# Patient Record
Sex: MALE | Race: WHITE | Employment: FULL TIME | URBAN - METROPOLITAN AREA
[De-identification: names, ages, dates, MRNs, and addresses within clinical notes are randomized per-mention and may not be internally consistent; named-entity substitution may affect disease eponyms.]

---

## 2017-06-16 ENCOUNTER — APPOINTMENT (OUTPATIENT)
Dept: GENERAL RADIOLOGY | Age: 37
End: 2017-06-16
Attending: INTERNAL MEDICINE
Payer: SUBSIDIZED

## 2017-06-16 ENCOUNTER — HOSPITAL ENCOUNTER (OUTPATIENT)
Age: 37
Setting detail: OBSERVATION
Discharge: HOME | End: 2017-06-18
Attending: EMERGENCY MEDICINE | Admitting: SURGERY
Payer: SUBSIDIZED

## 2017-06-16 ENCOUNTER — APPOINTMENT (OUTPATIENT)
Dept: ULTRASOUND IMAGING | Age: 37
End: 2017-06-16
Attending: EMERGENCY MEDICINE
Payer: SUBSIDIZED

## 2017-06-16 ENCOUNTER — ANESTHESIA (OUTPATIENT)
Dept: SURGERY | Age: 37
End: 2017-06-16
Payer: SUBSIDIZED

## 2017-06-16 ENCOUNTER — ANESTHESIA EVENT (OUTPATIENT)
Dept: SURGERY | Age: 37
End: 2017-06-16
Payer: SUBSIDIZED

## 2017-06-16 ENCOUNTER — APPOINTMENT (OUTPATIENT)
Dept: MRI IMAGING | Age: 37
End: 2017-06-16
Attending: EMERGENCY MEDICINE
Payer: SUBSIDIZED

## 2017-06-16 DIAGNOSIS — K81.0 ACUTE CHOLECYSTITIS: Primary | ICD-10-CM

## 2017-06-16 PROBLEM — K80.40 CHOLEDOCHOLITHIASIS WITH CHOLECYSTITIS: Status: ACTIVE | Noted: 2017-06-16

## 2017-06-16 LAB
ALBUMIN SERPL BCP-MCNC: 4.1 G/DL (ref 3.5–5)
ALBUMIN/GLOB SERPL: 1 {RATIO} (ref 1.1–2.2)
ALP SERPL-CCNC: 79 U/L (ref 45–117)
ALT SERPL-CCNC: 100 U/L (ref 12–78)
ANION GAP BLD CALC-SCNC: 8 MMOL/L (ref 5–15)
AST SERPL W P-5'-P-CCNC: 102 U/L (ref 15–37)
BASOPHILS # BLD AUTO: 0 K/UL (ref 0–0.1)
BASOPHILS # BLD: 0 % (ref 0–1)
BILIRUB SERPL-MCNC: 1 MG/DL (ref 0.2–1)
BUN SERPL-MCNC: 14 MG/DL (ref 6–20)
BUN/CREAT SERPL: 12 (ref 12–20)
CALCIUM SERPL-MCNC: 9.3 MG/DL (ref 8.5–10.1)
CHLORIDE SERPL-SCNC: 104 MMOL/L (ref 97–108)
CO2 SERPL-SCNC: 27 MMOL/L (ref 21–32)
CREAT SERPL-MCNC: 1.2 MG/DL (ref 0.7–1.3)
EOSINOPHIL # BLD: 0.1 K/UL (ref 0–0.4)
EOSINOPHIL NFR BLD: 0 % (ref 0–7)
ERYTHROCYTE [DISTWIDTH] IN BLOOD BY AUTOMATED COUNT: 13.4 % (ref 11.5–14.5)
GLOBULIN SER CALC-MCNC: 4 G/DL (ref 2–4)
GLUCOSE SERPL-MCNC: 233 MG/DL (ref 65–100)
HCT VFR BLD AUTO: 46.5 % (ref 36.6–50.3)
HGB BLD-MCNC: 15.9 G/DL (ref 12.1–17)
LIPASE SERPL-CCNC: 125 U/L (ref 73–393)
LYMPHOCYTES # BLD AUTO: 10 % (ref 12–49)
LYMPHOCYTES # BLD: 1.6 K/UL (ref 0.8–3.5)
MCH RBC QN AUTO: 28.1 PG (ref 26–34)
MCHC RBC AUTO-ENTMCNC: 34.2 G/DL (ref 30–36.5)
MCV RBC AUTO: 82.3 FL (ref 80–99)
MONOCYTES # BLD: 1.2 K/UL (ref 0–1)
MONOCYTES NFR BLD AUTO: 8 % (ref 5–13)
NEUTS SEG # BLD: 13 K/UL (ref 1.8–8)
NEUTS SEG NFR BLD AUTO: 82 % (ref 32–75)
PLATELET # BLD AUTO: 215 K/UL (ref 150–400)
POTASSIUM SERPL-SCNC: 3.3 MMOL/L (ref 3.5–5.1)
PROT SERPL-MCNC: 8.1 G/DL (ref 6.4–8.2)
RBC # BLD AUTO: 5.65 M/UL (ref 4.1–5.7)
SODIUM SERPL-SCNC: 139 MMOL/L (ref 136–145)
WBC # BLD AUTO: 15.9 K/UL (ref 4.1–11.1)

## 2017-06-16 PROCEDURE — 77030008684 HC TU ET CUF COVD -B: Performed by: ANESTHESIOLOGY

## 2017-06-16 PROCEDURE — 77030032490 HC SLV COMPR SCD KNE COVD -B: Performed by: SURGERY

## 2017-06-16 PROCEDURE — 77030018836 HC SOL IRR NACL ICUM -A: Performed by: SURGERY

## 2017-06-16 PROCEDURE — 99218 HC RM OBSERVATION: CPT

## 2017-06-16 PROCEDURE — 77030026438 HC STYL ET INTUB CARD -A: Performed by: ANESTHESIOLOGY

## 2017-06-16 PROCEDURE — 77030020061 HC IV BLD WRMR ADMIN SET 3M -B: Performed by: ANESTHESIOLOGY

## 2017-06-16 PROCEDURE — 74011000258 HC RX REV CODE- 258: Performed by: EMERGENCY MEDICINE

## 2017-06-16 PROCEDURE — 83690 ASSAY OF LIPASE: CPT | Performed by: EMERGENCY MEDICINE

## 2017-06-16 PROCEDURE — 74011250636 HC RX REV CODE- 250/636

## 2017-06-16 PROCEDURE — 77030020782 HC GWN BAIR PAWS FLX 3M -B

## 2017-06-16 PROCEDURE — 76060000038 HC ANESTHESIA 3.5 TO 4 HR: Performed by: SURGERY

## 2017-06-16 PROCEDURE — 77030011640 HC PAD GRND REM COVD -A: Performed by: SURGERY

## 2017-06-16 PROCEDURE — 74011250636 HC RX REV CODE- 250/636: Performed by: SURGERY

## 2017-06-16 PROCEDURE — 99284 EMERGENCY DEPT VISIT MOD MDM: CPT

## 2017-06-16 PROCEDURE — 85025 COMPLETE CBC W/AUTO DIFF WBC: CPT | Performed by: EMERGENCY MEDICINE

## 2017-06-16 PROCEDURE — 74011000250 HC RX REV CODE- 250

## 2017-06-16 PROCEDURE — 77030002933 HC SUT MCRYL J&J -A: Performed by: SURGERY

## 2017-06-16 PROCEDURE — 80053 COMPREHEN METABOLIC PANEL: CPT | Performed by: EMERGENCY MEDICINE

## 2017-06-16 PROCEDURE — 76010000134 HC OR TIME 3.5 TO 4 HR: Performed by: SURGERY

## 2017-06-16 PROCEDURE — 93005 ELECTROCARDIOGRAM TRACING: CPT

## 2017-06-16 PROCEDURE — 77030012022 HC APPL CLP ENDOSC COVD -C: Performed by: SURGERY

## 2017-06-16 PROCEDURE — 74330 X-RAY BILE/PANC ENDOSCOPY: CPT

## 2017-06-16 PROCEDURE — 77030035048 HC TRCR ENDOSC OPTCL COVD -B: Performed by: SURGERY

## 2017-06-16 PROCEDURE — 74011250636 HC RX REV CODE- 250/636: Performed by: EMERGENCY MEDICINE

## 2017-06-16 PROCEDURE — 77030035220 HC TRCR ENDOSC BLNTPRT ANCHR COVD -B: Performed by: SURGERY

## 2017-06-16 PROCEDURE — 88304 TISSUE EXAM BY PATHOLOGIST: CPT | Performed by: SURGERY

## 2017-06-16 PROCEDURE — 77030032435

## 2017-06-16 PROCEDURE — 74011000258 HC RX REV CODE- 258: Performed by: SURGERY

## 2017-06-16 PROCEDURE — 77030009852 HC PCH RTVR ENDOSC COVD -B: Performed by: SURGERY

## 2017-06-16 PROCEDURE — C1757 CATH, THROMBECTOMY/EMBOLECT: HCPCS | Performed by: SURGERY

## 2017-06-16 PROCEDURE — 96361 HYDRATE IV INFUSION ADD-ON: CPT

## 2017-06-16 PROCEDURE — 77030019908 HC STETH ESOPH SIMS -A: Performed by: ANESTHESIOLOGY

## 2017-06-16 PROCEDURE — 76705 ECHO EXAM OF ABDOMEN: CPT

## 2017-06-16 PROCEDURE — 77030020053 HC ELECTRD LAPSCP COVD -B: Performed by: SURGERY

## 2017-06-16 PROCEDURE — 77030008771 HC TU NG SALEM SUMP -A: Performed by: ANESTHESIOLOGY

## 2017-06-16 PROCEDURE — 77030031139 HC SUT VCRL2 J&J -A: Performed by: SURGERY

## 2017-06-16 PROCEDURE — 74011000250 HC RX REV CODE- 250: Performed by: SURGERY

## 2017-06-16 PROCEDURE — 96375 TX/PRO/DX INJ NEW DRUG ADDON: CPT

## 2017-06-16 PROCEDURE — 96365 THER/PROPH/DIAG IV INF INIT: CPT

## 2017-06-16 PROCEDURE — 77030035051: Performed by: SURGERY

## 2017-06-16 PROCEDURE — 76210000001 HC OR PH I REC 2.5 TO 3 HR: Performed by: SURGERY

## 2017-06-16 PROCEDURE — 74011250636 HC RX REV CODE- 250/636: Performed by: ANESTHESIOLOGY

## 2017-06-16 PROCEDURE — 36415 COLL VENOUS BLD VENIPUNCTURE: CPT | Performed by: EMERGENCY MEDICINE

## 2017-06-16 PROCEDURE — 74181 MRI ABDOMEN W/O CONTRAST: CPT

## 2017-06-16 PROCEDURE — 96376 TX/PRO/DX INJ SAME DRUG ADON: CPT

## 2017-06-16 RX ORDER — ONDANSETRON 2 MG/ML
INJECTION INTRAMUSCULAR; INTRAVENOUS AS NEEDED
Status: DISCONTINUED | OUTPATIENT
Start: 2017-06-16 | End: 2017-06-16 | Stop reason: HOSPADM

## 2017-06-16 RX ORDER — ONDANSETRON 2 MG/ML
4 INJECTION INTRAMUSCULAR; INTRAVENOUS
Status: COMPLETED | OUTPATIENT
Start: 2017-06-16 | End: 2017-06-16

## 2017-06-16 RX ORDER — SUCCINYLCHOLINE CHLORIDE 20 MG/ML
INJECTION INTRAMUSCULAR; INTRAVENOUS AS NEEDED
Status: DISCONTINUED | OUTPATIENT
Start: 2017-06-16 | End: 2017-06-16 | Stop reason: HOSPADM

## 2017-06-16 RX ORDER — NEOSTIGMINE METHYLSULFATE 1 MG/ML
INJECTION INTRAVENOUS AS NEEDED
Status: DISCONTINUED | OUTPATIENT
Start: 2017-06-16 | End: 2017-06-16 | Stop reason: HOSPADM

## 2017-06-16 RX ORDER — SODIUM CHLORIDE 9 MG/ML
150 INJECTION, SOLUTION INTRAVENOUS ONCE
Status: COMPLETED | OUTPATIENT
Start: 2017-06-16 | End: 2017-06-17

## 2017-06-16 RX ORDER — FLUMAZENIL 0.1 MG/ML
0.2 INJECTION INTRAVENOUS
Status: DISCONTINUED | OUTPATIENT
Start: 2017-06-16 | End: 2017-06-16

## 2017-06-16 RX ORDER — BUPIVACAINE HYDROCHLORIDE 5 MG/ML
INJECTION, SOLUTION EPIDURAL; INTRACAUDAL AS NEEDED
Status: DISCONTINUED | OUTPATIENT
Start: 2017-06-16 | End: 2017-06-16 | Stop reason: HOSPADM

## 2017-06-16 RX ORDER — ROCURONIUM BROMIDE 10 MG/ML
INJECTION, SOLUTION INTRAVENOUS AS NEEDED
Status: DISCONTINUED | OUTPATIENT
Start: 2017-06-16 | End: 2017-06-16 | Stop reason: HOSPADM

## 2017-06-16 RX ORDER — MIDAZOLAM HYDROCHLORIDE 1 MG/ML
0.5 INJECTION, SOLUTION INTRAMUSCULAR; INTRAVENOUS
Status: DISCONTINUED | OUTPATIENT
Start: 2017-06-16 | End: 2017-06-16 | Stop reason: HOSPADM

## 2017-06-16 RX ORDER — FENTANYL CITRATE 50 UG/ML
50 INJECTION, SOLUTION INTRAMUSCULAR; INTRAVENOUS AS NEEDED
Status: DISCONTINUED | OUTPATIENT
Start: 2017-06-16 | End: 2017-06-16 | Stop reason: HOSPADM

## 2017-06-16 RX ORDER — HYDROMORPHONE HYDROCHLORIDE 1 MG/ML
2 INJECTION, SOLUTION INTRAMUSCULAR; INTRAVENOUS; SUBCUTANEOUS
Status: COMPLETED | OUTPATIENT
Start: 2017-06-16 | End: 2017-06-16

## 2017-06-16 RX ORDER — SODIUM CHLORIDE, SODIUM LACTATE, POTASSIUM CHLORIDE, CALCIUM CHLORIDE 600; 310; 30; 20 MG/100ML; MG/100ML; MG/100ML; MG/100ML
100 INJECTION, SOLUTION INTRAVENOUS CONTINUOUS
Status: DISCONTINUED | OUTPATIENT
Start: 2017-06-16 | End: 2017-06-16 | Stop reason: HOSPADM

## 2017-06-16 RX ORDER — SODIUM CHLORIDE 0.9 % (FLUSH) 0.9 %
5-10 SYRINGE (ML) INJECTION EVERY 8 HOURS
Status: DISCONTINUED | OUTPATIENT
Start: 2017-06-16 | End: 2017-06-16

## 2017-06-16 RX ORDER — PROPOFOL 10 MG/ML
INJECTION, EMULSION INTRAVENOUS AS NEEDED
Status: DISCONTINUED | OUTPATIENT
Start: 2017-06-16 | End: 2017-06-16 | Stop reason: HOSPADM

## 2017-06-16 RX ORDER — HYDROMORPHONE HYDROCHLORIDE 1 MG/ML
1 INJECTION, SOLUTION INTRAMUSCULAR; INTRAVENOUS; SUBCUTANEOUS
Status: COMPLETED | OUTPATIENT
Start: 2017-06-16 | End: 2017-06-16

## 2017-06-16 RX ORDER — NALOXONE HYDROCHLORIDE 0.4 MG/ML
0.4 INJECTION, SOLUTION INTRAMUSCULAR; INTRAVENOUS; SUBCUTANEOUS
Status: DISCONTINUED | OUTPATIENT
Start: 2017-06-16 | End: 2017-06-16

## 2017-06-16 RX ORDER — GLYCOPYRROLATE 0.2 MG/ML
INJECTION INTRAMUSCULAR; INTRAVENOUS AS NEEDED
Status: DISCONTINUED | OUTPATIENT
Start: 2017-06-16 | End: 2017-06-16 | Stop reason: HOSPADM

## 2017-06-16 RX ORDER — LIDOCAINE HYDROCHLORIDE 20 MG/ML
INJECTION, SOLUTION EPIDURAL; INFILTRATION; INTRACAUDAL; PERINEURAL AS NEEDED
Status: DISCONTINUED | OUTPATIENT
Start: 2017-06-16 | End: 2017-06-16 | Stop reason: HOSPADM

## 2017-06-16 RX ORDER — SODIUM CHLORIDE 0.9 % (FLUSH) 0.9 %
5-10 SYRINGE (ML) INJECTION AS NEEDED
Status: DISCONTINUED | OUTPATIENT
Start: 2017-06-16 | End: 2017-06-16

## 2017-06-16 RX ORDER — HYDROMORPHONE HYDROCHLORIDE 1 MG/ML
0.5 INJECTION, SOLUTION INTRAMUSCULAR; INTRAVENOUS; SUBCUTANEOUS
Status: DISCONTINUED | OUTPATIENT
Start: 2017-06-16 | End: 2017-06-16 | Stop reason: HOSPADM

## 2017-06-16 RX ORDER — ONDANSETRON 2 MG/ML
4 INJECTION INTRAMUSCULAR; INTRAVENOUS AS NEEDED
Status: DISCONTINUED | OUTPATIENT
Start: 2017-06-16 | End: 2017-06-16 | Stop reason: HOSPADM

## 2017-06-16 RX ORDER — DEXAMETHASONE SODIUM PHOSPHATE 4 MG/ML
INJECTION, SOLUTION INTRA-ARTICULAR; INTRALESIONAL; INTRAMUSCULAR; INTRAVENOUS; SOFT TISSUE AS NEEDED
Status: DISCONTINUED | OUTPATIENT
Start: 2017-06-16 | End: 2017-06-16 | Stop reason: HOSPADM

## 2017-06-16 RX ORDER — DEXTROMETHORPHAN/PSEUDOEPHED 2.5-7.5/.8
1.2 DROPS ORAL
Status: DISCONTINUED | OUTPATIENT
Start: 2017-06-16 | End: 2017-06-16

## 2017-06-16 RX ORDER — FENTANYL CITRATE 50 UG/ML
25 INJECTION, SOLUTION INTRAMUSCULAR; INTRAVENOUS
Status: DISCONTINUED | OUTPATIENT
Start: 2017-06-16 | End: 2017-06-16 | Stop reason: HOSPADM

## 2017-06-16 RX ORDER — EPINEPHRINE 0.1 MG/ML
1 INJECTION INTRACARDIAC; INTRAVENOUS
Status: DISCONTINUED | OUTPATIENT
Start: 2017-06-16 | End: 2017-06-16

## 2017-06-16 RX ORDER — ONDANSETRON 2 MG/ML
4 INJECTION INTRAMUSCULAR; INTRAVENOUS
Status: DISCONTINUED | OUTPATIENT
Start: 2017-06-16 | End: 2017-06-18 | Stop reason: HOSPADM

## 2017-06-16 RX ORDER — SODIUM CHLORIDE 0.9 % (FLUSH) 0.9 %
5-10 SYRINGE (ML) INJECTION EVERY 8 HOURS
Status: DISCONTINUED | OUTPATIENT
Start: 2017-06-16 | End: 2017-06-18 | Stop reason: HOSPADM

## 2017-06-16 RX ORDER — HYDROCODONE BITARTRATE AND ACETAMINOPHEN 5; 325 MG/1; MG/1
1 TABLET ORAL AS NEEDED
Status: DISCONTINUED | OUTPATIENT
Start: 2017-06-16 | End: 2017-06-16 | Stop reason: HOSPADM

## 2017-06-16 RX ORDER — ATROPINE SULFATE 0.1 MG/ML
0.5 INJECTION INTRAVENOUS
Status: DISCONTINUED | OUTPATIENT
Start: 2017-06-16 | End: 2017-06-16

## 2017-06-16 RX ORDER — SODIUM CHLORIDE 0.9 % (FLUSH) 0.9 %
5-10 SYRINGE (ML) INJECTION AS NEEDED
Status: DISCONTINUED | OUTPATIENT
Start: 2017-06-16 | End: 2017-06-18 | Stop reason: HOSPADM

## 2017-06-16 RX ORDER — ACETAMINOPHEN 10 MG/ML
INJECTION, SOLUTION INTRAVENOUS AS NEEDED
Status: DISCONTINUED | OUTPATIENT
Start: 2017-06-16 | End: 2017-06-16 | Stop reason: HOSPADM

## 2017-06-16 RX ORDER — HYDROMORPHONE HYDROCHLORIDE 1 MG/ML
0.5 INJECTION, SOLUTION INTRAMUSCULAR; INTRAVENOUS; SUBCUTANEOUS ONCE
Status: COMPLETED | OUTPATIENT
Start: 2017-06-16 | End: 2017-06-16

## 2017-06-16 RX ORDER — HYDROMORPHONE HYDROCHLORIDE 2 MG/ML
INJECTION, SOLUTION INTRAMUSCULAR; INTRAVENOUS; SUBCUTANEOUS AS NEEDED
Status: DISCONTINUED | OUTPATIENT
Start: 2017-06-16 | End: 2017-06-16 | Stop reason: HOSPADM

## 2017-06-16 RX ORDER — FENTANYL CITRATE 50 UG/ML
25 INJECTION, SOLUTION INTRAMUSCULAR; INTRAVENOUS
Status: DISCONTINUED | OUTPATIENT
Start: 2017-06-16 | End: 2017-06-16

## 2017-06-16 RX ORDER — HYDROCODONE BITARTRATE AND ACETAMINOPHEN 5; 325 MG/1; MG/1
1-2 TABLET ORAL
Status: DISCONTINUED | OUTPATIENT
Start: 2017-06-16 | End: 2017-06-18 | Stop reason: HOSPADM

## 2017-06-16 RX ORDER — SODIUM CHLORIDE 9 MG/ML
150 INJECTION, SOLUTION INTRAVENOUS CONTINUOUS
Status: DISPENSED | OUTPATIENT
Start: 2017-06-16 | End: 2017-06-17

## 2017-06-16 RX ORDER — SODIUM CHLORIDE 9 MG/ML
75 INJECTION, SOLUTION INTRAVENOUS CONTINUOUS
Status: DISCONTINUED | OUTPATIENT
Start: 2017-06-16 | End: 2017-06-16

## 2017-06-16 RX ORDER — DIPHENHYDRAMINE HYDROCHLORIDE 50 MG/ML
12.5 INJECTION, SOLUTION INTRAMUSCULAR; INTRAVENOUS AS NEEDED
Status: DISCONTINUED | OUTPATIENT
Start: 2017-06-16 | End: 2017-06-16 | Stop reason: HOSPADM

## 2017-06-16 RX ORDER — MIDAZOLAM HYDROCHLORIDE 1 MG/ML
1 INJECTION, SOLUTION INTRAMUSCULAR; INTRAVENOUS AS NEEDED
Status: DISCONTINUED | OUTPATIENT
Start: 2017-06-16 | End: 2017-06-16 | Stop reason: HOSPADM

## 2017-06-16 RX ORDER — MIDAZOLAM HYDROCHLORIDE 1 MG/ML
.25-5 INJECTION, SOLUTION INTRAMUSCULAR; INTRAVENOUS
Status: DISCONTINUED | OUTPATIENT
Start: 2017-06-16 | End: 2017-06-16

## 2017-06-16 RX ORDER — HYDROMORPHONE HYDROCHLORIDE 1 MG/ML
0.5 INJECTION, SOLUTION INTRAMUSCULAR; INTRAVENOUS; SUBCUTANEOUS
Status: DISCONTINUED | OUTPATIENT
Start: 2017-06-16 | End: 2017-06-18 | Stop reason: HOSPADM

## 2017-06-16 RX ORDER — MIDAZOLAM HYDROCHLORIDE 1 MG/ML
INJECTION, SOLUTION INTRAMUSCULAR; INTRAVENOUS AS NEEDED
Status: DISCONTINUED | OUTPATIENT
Start: 2017-06-16 | End: 2017-06-16 | Stop reason: HOSPADM

## 2017-06-16 RX ORDER — SODIUM CHLORIDE, SODIUM LACTATE, POTASSIUM CHLORIDE, CALCIUM CHLORIDE 600; 310; 30; 20 MG/100ML; MG/100ML; MG/100ML; MG/100ML
25 INJECTION, SOLUTION INTRAVENOUS CONTINUOUS
Status: DISCONTINUED | OUTPATIENT
Start: 2017-06-16 | End: 2017-06-16 | Stop reason: HOSPADM

## 2017-06-16 RX ORDER — FENTANYL CITRATE 50 UG/ML
INJECTION, SOLUTION INTRAMUSCULAR; INTRAVENOUS AS NEEDED
Status: DISCONTINUED | OUTPATIENT
Start: 2017-06-16 | End: 2017-06-16 | Stop reason: HOSPADM

## 2017-06-16 RX ORDER — LIDOCAINE HYDROCHLORIDE 10 MG/ML
0.1 INJECTION, SOLUTION EPIDURAL; INFILTRATION; INTRACAUDAL; PERINEURAL AS NEEDED
Status: DISCONTINUED | OUTPATIENT
Start: 2017-06-16 | End: 2017-06-16 | Stop reason: HOSPADM

## 2017-06-16 RX ADMIN — PROPOFOL 50 MG: 10 INJECTION, EMULSION INTRAVENOUS at 14:37

## 2017-06-16 RX ADMIN — ONDANSETRON 4 MG: 2 INJECTION INTRAMUSCULAR; INTRAVENOUS at 16:45

## 2017-06-16 RX ADMIN — HYDROMORPHONE HYDROCHLORIDE 2 MG: 1 INJECTION, SOLUTION INTRAMUSCULAR; INTRAVENOUS; SUBCUTANEOUS at 05:26

## 2017-06-16 RX ADMIN — ACETAMINOPHEN 1000 MG: 10 INJECTION, SOLUTION INTRAVENOUS at 15:00

## 2017-06-16 RX ADMIN — PROPOFOL 50 MG: 10 INJECTION, EMULSION INTRAVENOUS at 14:32

## 2017-06-16 RX ADMIN — HYDROMORPHONE HYDROCHLORIDE 1 MG: 1 INJECTION, SOLUTION INTRAMUSCULAR; INTRAVENOUS; SUBCUTANEOUS at 03:56

## 2017-06-16 RX ADMIN — SODIUM CHLORIDE, SODIUM LACTATE, POTASSIUM CHLORIDE, AND CALCIUM CHLORIDE 25 ML/HR: 600; 310; 30; 20 INJECTION, SOLUTION INTRAVENOUS at 13:59

## 2017-06-16 RX ADMIN — SODIUM CHLORIDE 1000 ML: 900 INJECTION, SOLUTION INTRAVENOUS at 03:56

## 2017-06-16 RX ADMIN — PROPOFOL 200 MG: 10 INJECTION, EMULSION INTRAVENOUS at 14:25

## 2017-06-16 RX ADMIN — NEOSTIGMINE METHYLSULFATE 3 MG: 1 INJECTION INTRAVENOUS at 17:40

## 2017-06-16 RX ADMIN — PROPOFOL 50 MG: 10 INJECTION, EMULSION INTRAVENOUS at 14:29

## 2017-06-16 RX ADMIN — LIDOCAINE HYDROCHLORIDE 100 MG: 20 INJECTION, SOLUTION EPIDURAL; INFILTRATION; INTRACAUDAL; PERINEURAL at 14:25

## 2017-06-16 RX ADMIN — PIPERACILLIN SODIUM,TAZOBACTAM SODIUM 4.5 G: 4; .5 INJECTION, POWDER, FOR SOLUTION INTRAVENOUS at 16:35

## 2017-06-16 RX ADMIN — FENTANYL CITRATE 50 MCG: 50 INJECTION, SOLUTION INTRAMUSCULAR; INTRAVENOUS at 16:30

## 2017-06-16 RX ADMIN — SODIUM CHLORIDE 150 ML/HR: 900 INJECTION, SOLUTION INTRAVENOUS at 08:50

## 2017-06-16 RX ADMIN — SODIUM CHLORIDE, SODIUM LACTATE, POTASSIUM CHLORIDE, AND CALCIUM CHLORIDE: 600; 310; 30; 20 INJECTION, SOLUTION INTRAVENOUS at 17:45

## 2017-06-16 RX ADMIN — ROCURONIUM BROMIDE 10 MG: 10 INJECTION, SOLUTION INTRAVENOUS at 14:25

## 2017-06-16 RX ADMIN — HYDROMORPHONE HYDROCHLORIDE 1.2 MG: 2 INJECTION, SOLUTION INTRAMUSCULAR; INTRAVENOUS; SUBCUTANEOUS at 16:43

## 2017-06-16 RX ADMIN — GLYCOPYRROLATE 0.2 MG: 0.2 INJECTION INTRAMUSCULAR; INTRAVENOUS at 14:17

## 2017-06-16 RX ADMIN — GLYCOPYRROLATE 0.5 MG: 0.2 INJECTION INTRAMUSCULAR; INTRAVENOUS at 17:40

## 2017-06-16 RX ADMIN — MIDAZOLAM HYDROCHLORIDE 2 MG: 1 INJECTION, SOLUTION INTRAMUSCULAR; INTRAVENOUS at 14:17

## 2017-06-16 RX ADMIN — ROCURONIUM BROMIDE 10 MG: 10 INJECTION, SOLUTION INTRAVENOUS at 14:44

## 2017-06-16 RX ADMIN — DEXAMETHASONE SODIUM PHOSPHATE 8 MG: 4 INJECTION, SOLUTION INTRA-ARTICULAR; INTRALESIONAL; INTRAMUSCULAR; INTRAVENOUS; SOFT TISSUE at 14:50

## 2017-06-16 RX ADMIN — FENTANYL CITRATE 100 MCG: 50 INJECTION, SOLUTION INTRAMUSCULAR; INTRAVENOUS at 14:25

## 2017-06-16 RX ADMIN — SODIUM CHLORIDE 150 ML/HR: 900 INJECTION, SOLUTION INTRAVENOUS at 18:17

## 2017-06-16 RX ADMIN — SUCCINYLCHOLINE CHLORIDE 180 MG: 20 INJECTION INTRAMUSCULAR; INTRAVENOUS at 14:26

## 2017-06-16 RX ADMIN — ROCURONIUM BROMIDE 10 MG: 10 INJECTION, SOLUTION INTRAVENOUS at 15:50

## 2017-06-16 RX ADMIN — PIPERACILLIN SODIUM,TAZOBACTAM SODIUM 4.5 G: 4; .5 INJECTION, POWDER, FOR SOLUTION INTRAVENOUS at 08:50

## 2017-06-16 RX ADMIN — HYDROMORPHONE HYDROCHLORIDE 0.8 MG: 2 INJECTION, SOLUTION INTRAMUSCULAR; INTRAVENOUS; SUBCUTANEOUS at 17:56

## 2017-06-16 RX ADMIN — ONDANSETRON HYDROCHLORIDE 4 MG: 2 INJECTION, SOLUTION INTRAMUSCULAR; INTRAVENOUS at 03:56

## 2017-06-16 RX ADMIN — HYDROMORPHONE HYDROCHLORIDE 0.5 MG: 1 INJECTION, SOLUTION INTRAMUSCULAR; INTRAVENOUS; SUBCUTANEOUS at 11:48

## 2017-06-16 RX ADMIN — SODIUM CHLORIDE, SODIUM LACTATE, POTASSIUM CHLORIDE, AND CALCIUM CHLORIDE: 600; 310; 30; 20 INJECTION, SOLUTION INTRAVENOUS at 15:45

## 2017-06-16 RX ADMIN — ROCURONIUM BROMIDE 30 MG: 10 INJECTION, SOLUTION INTRAVENOUS at 16:06

## 2017-06-16 RX ADMIN — ROCURONIUM BROMIDE 30 MG: 10 INJECTION, SOLUTION INTRAVENOUS at 14:32

## 2017-06-16 NOTE — ROUTINE PROCESS
TRANSFER - IN REPORT:    Verbal report received from VITO castillo RN(name) on Zarina Estrada  being received from OR(unit) for routine post - op      Report consisted of patients Situation, Background, Assessment and   Recommendations(SBAR). Information from the following report(s) OR Summary was reviewed with the receiving nurse. Opportunity for questions and clarification was provided. Assessment completed upon patients arrival to unit and care assumed.

## 2017-06-16 NOTE — IP AVS SNAPSHOT
Summary of Care Report The Summary of Care report has been created to help improve care coordination. Users with access to [employer] or [address] (Web-based application) may access additional patient information including the Discharge Summary. If you are not currently a [address] user and need more information, please call the number listed below in the [place] [address] section and ask to be connected with Medical Records. Facility Information Name Address Phone [address] [address] [phone] Patient Information Patient Name Sex ABBEY Carnes ([id number]) Male 1980 Discharge Information Admitting Provider Service Area Unit Kayce Garcia MD / [phone] [facility] 2 General Surgery / [phone] Discharge Provider Discharge Date/Time Discharge Disposition Destination (none) 2017 Afternoon (Pending) AHR (none) Patient Language Language ENGLISH [13] Hospital Problems as of 2017  Reviewed: 2017 11:47 AM by Kayce Garcia MD  
  
  
  
 Class Noted - Resolved Last Modified POA Active Problems Choledocholithiasis with cholecystitis  2017 - Present 2017 by Kayce Garcia MD Unknown Entered by Kayce Garcia MD  
  
Non-Hospital Problems as of 2017  Reviewed: 2017 11:47 AM by Kayce Garcia MD  
 None You are allergic to the following No active allergies Current Discharge Medication List  
  
START taking these medications Dose & Instructions Dispensing Information Comments HYDROcodone-acetaminophen 5-325 mg per tablet Commonly known as:  Opal Perez Dose:  1-2 Tab Take 1-2 Tabs by mouth every four (4) hours as needed for Pain. Max Daily Amount: 12 Tabs. Quantity:  30 Tab Refills:  0 Surgery Information ID Date/Time Status Primary Surgeon All Procedures Location [id number] 6/16/2017 [address] MD LAPAROSCOPIC CHOLECYSTECTOMY  
ENDOSCOPIC RETROGRADE CHOLANGIOPANCREATOGRAPHY [facility] MAIN OR Follow-up Information Follow up With Details Comments Contact Info None   None (395) Patient stated that they have no PCP Discharge Instructions Cholecystectomy: What to Expect at [facility] Your Recovery After your surgery, it is normal to feel weak and tired for several days after you return home. Your belly may be swollen. If you had laparoscopic surgery, you may also have pain in your shoulder for about 24 hours. You may have gas or need to burp a lot at first, and a few people get diarrhea. The diarrhea usually goes away in 2 to 4 weeks, but it may last longer. How quickly you recover depends on whether you had a laparoscopic or open surgery. · For a laparoscopic surgery, most people can go back to work or their normal routine in 1 to 2 weeks, but it may take longer, depending on the type of work you do. · For an open surgery, it will probably take 4 to 6 weeks before you get back to your normal routine. This care sheet gives you a general idea about how long it will take for you to recover. However, each person recovers at a different pace. Follow the steps below to get better as quickly as possible. How can you care for yourself at home? Activity · Rest when you feel tired. Getting enough sleep will help you recover. · Try to walk each day. Start out by walking a little more than you did the day before. Gradually increase the amount you walk. Walking boosts blood flow and helps prevent pneumonia and constipation. · For about 2 to 4 weeks, avoid lifting anything that would make you strain. This may include a child, heavy grocery bags and milk containers, a heavy briefcase or backpack, cat litter or dog food bags, or a vacuum . · Avoid strenuous activities, such as biking, jogging, weightlifting, and aerobic exercise, until your doctor says it is okay. · You may shower 24 to 48 hours after surgery, if your doctor okays it. Pat the cut (incision) dry. Do not take a bath for the first 2 weeks, or until your doctor tells you it is okay. · You may drive when you are no longer taking pain medicine and can quickly move your foot from the gas pedal to the brake. You must also be able to sit comfortably for a long period of time, even if you do not plan to go far. You might get caught in traffic. · For a laparoscopic surgery, most people can go back to work or their normal routine in 1 to 2 weeks, but it may take longer. For an open surgery, it will probably take 4 to 6 weeks before you get back to your normal routine. · Your doctor will tell you when you can have sex again. Diet · Eat smaller meals more often instead of fewer larger meals. You can eat a normal diet, but avoid eating fatty foods for about 1 month. Fatty foods include hamburger, whole milk, cheese, and many snack foods. If your stomach is upset, try bland, low-fat foods like plain rice, broiled chicken, toast, and yogurt. · Drink plenty of fluids (unless your doctor tells you not to). · If you have diarrhea, try avoiding spicy foods, dairy products, fatty foods, and alcohol. You can also watch to see if specific foods cause it, and stop eating them. If the diarrhea continues for more than 2 weeks, talk to your doctor. · You may notice that your bowel movements are not regular right after your surgery. This is common. Try to avoid constipation and straining with bowel movements. You may want to take a fiber supplement every day. If you have not had a bowel movement after a couple of days, ask your doctor about taking a mild laxative. Medicines · Take pain medicines exactly as directed. ¨ If the doctor gave you a prescription medicine for pain, take it as prescribed. ¨ If you are not taking a prescription pain medicine, take an over-the-counter medicine such as acetaminophen (Tylenol), ibuprofen (Advil, Motrin), or naproxen (Aleve). Read and follow all instructions on the label. ¨ Do not take two or more pain medicines at the same time unless the doctor told you to. Many pain medicines contain acetaminophen, which is Tylenol. Too much Tylenol can be harmful. · If you think your pain medicine is making you sick to your stomach: 
¨ Take your medicine after meals (unless your doctor tells you not to). ¨ Ask your doctor for a different pain medicine. · If your doctor prescribed antibiotics, take them as directed. Do not stop taking them just because you feel better. You need to take the full course of antibiotics. Incision care · If you have strips of tape on the incision, or cut, leave the tape on for a week or until it falls off. · After 24 to 48 hours, wash the area daily with warm, soapy water, and pat it dry. · You may have staples to hold the cut together. Keep them dry until your doctor takes them out. This is usually in 7 to 10 days. · Keep the area clean and dry. You may cover it with a gauze bandage if it weeps or rubs against clothing. Change the bandage every day. Ice · To reduce swelling and pain, put ice or a cold pack on your belly for 10 to 20 minutes at a time. Do this every 1 to 2 hours. Put a thin cloth between the ice and your skin. Follow-up care is a key part of your treatment and safety. Be sure to make and go to all appointments, and call your doctor if you are having problems. Its also a good idea to know your test results and keep a list of the medicines you take. When should you call for help? Call 911 anytime you think you may need emergency care. For example, call if: 
· You passed out (lost consciousness). · You have severe trouble breathing. · You have sudden chest pain and shortness of breath, or you cough up blood. Call your doctor now or seek immediate medical care if: 
· You are sick to your stomach and cannot drink fluids. · You have pain that does not get better when you take your pain medicine. · You have signs of infection, such as: 
¨ Increased pain, swelling, warmth, or redness. ¨ Red streaks leading from the incision. ¨ Pus draining from the incision. ¨ Swollen lymph nodes in your neck, armpits, or groin. ¨ A fever. · Your urine turns dark brown or your stool is light-colored or kassandra-colored. · Your skin or the whites of your eyes turn yellow. · Bright red blood has soaked through a large bandage over your incision. · You have signs of a blood clot, such as: 
¨ Pain in your calf, back of knee, thigh, or groin. ¨ Redness and swelling in your leg or groin. · You have trouble passing urine or stool, especially if you have mild pain or swelling in your lower belly. Watch closely for any changes in your health, and be sure to contact your doctor if: 
· You had a laparoscopic surgery and your shoulder pain lasts more than 24 hours. · You do not have a bowel movement after taking a laxative. Where can you learn more? Go to [employer]design[employer]be Enter 450 45 295 in the search box to learn more about \"Cholecystectomy: What to Expect at Home. \"  
© 0805-7324 Healthwise, Incorporated. Care instructions adapted under license by [employer] (which disclaims liability or warranty for this information). This care instruction is for use with your licensed healthcare professional. If you have questions about a medical condition or this instruction, always ask your healthcare professional. [place] [address] any warranty or liability for your use of this information. Content Version: 90.7.967372; Current as of: November 14, 2014 Chart Review Routing History No Routing History on File

## 2017-06-16 NOTE — IP AVS SNAPSHOT
[address] [facility] 
[phone] Patient: Sheryl Russo MRN: [id number] XZM:[id number] You are allergic to the following No active allergies Recent Documentation Height Weight BMI Smoking Status 1.905 m 132.1 kg 36.4 kg/m2 Never Smoker Emergency Contacts Name Discharge Info Relation Home Work Mobile Stephy [place] DECLINED CAREGIVER [4] Mother [14] [phone] About your hospitalization You were admitted on:  June 16, 2017 You last received care in the:  [facility] 2 GENERAL SURGERY You were discharged on:  June 18, 2017 Unit phone number:  [phone] Why you were hospitalized Your primary diagnosis was:  Not on File Your diagnoses also included:  Choledocholithiasis With Cholecystitis Providers Seen During Your Hospitalizations Provider Role Specialty Primary office phone Eron Carreno MD Attending Provider Emergency Medicine [phone] Clement Melendez MD Attending Provider General Surgery [phone] Your Primary Care Physician (PCP) Primary Care Physician Office Phone Office Fax NONE ** None ** ** None ** Follow-up Information Follow up With Details Comments Contact Info None   None (395) Patient stated that they have no PCP Current Discharge Medication List  
  
START taking these medications Dose & Instructions Dispensing Information Comments Morning Noon Evening Bedtime HYDROcodone-acetaminophen 5-325 mg per tablet Commonly known as:  Jae Knox Your last dose was: Your next dose is:    
   
   
 Dose:  1-2 Tab Take 1-2 Tabs by mouth every four (4) hours as needed for Pain. Max Daily Amount: 12 Tabs. Quantity:  30 Tab Refills:  0 Where to Get Your Medications Information on where to get these meds will be given to you by the nurse or doctor. ! Ask your nurse or doctor about these medications HYDROcodone-acetaminophen 5-325 mg per tablet Discharge Instructions Cholecystectomy: What to Expect at [facility] Your Recovery After your surgery, it is normal to feel weak and tired for several days after you return home. Your belly may be swollen. If you had laparoscopic surgery, you may also have pain in your shoulder for about 24 hours. You may have gas or need to burp a lot at first, and a few people get diarrhea. The diarrhea usually goes away in 2 to 4 weeks, but it may last longer. How quickly you recover depends on whether you had a laparoscopic or open surgery. · For a laparoscopic surgery, most people can go back to work or their normal routine in 1 to 2 weeks, but it may take longer, depending on the type of work you do. · For an open surgery, it will probably take 4 to 6 weeks before you get back to your normal routine. This care sheet gives you a general idea about how long it will take for you to recover. However, each person recovers at a different pace. Follow the steps below to get better as quickly as possible. How can you care for yourself at home? Activity · Rest when you feel tired. Getting enough sleep will help you recover. · Try to walk each day. Start out by walking a little more than you did the day before. Gradually increase the amount you walk. Walking boosts blood flow and helps prevent pneumonia and constipation. · For about 2 to 4 weeks, avoid lifting anything that would make you strain. This may include a child, heavy grocery bags and milk containers, a heavy briefcase or backpack, cat litter or dog food bags, or a vacuum . · Avoid strenuous activities, such as biking, jogging, weightlifting, and aerobic exercise, until your doctor says it is okay. · You may shower 24 to 48 hours after surgery, if your doctor okays it. Pat the cut (incision) dry. Do not take a bath for the first 2 weeks, or until your doctor tells you it is okay. · You may drive when you are no longer taking pain medicine and can quickly move your foot from the gas pedal to the brake. You must also be able to sit comfortably for a long period of time, even if you do not plan to go far. You might get caught in traffic. · For a laparoscopic surgery, most people can go back to work or their normal routine in 1 to 2 weeks, but it may take longer. For an open surgery, it will probably take 4 to 6 weeks before you get back to your normal routine. · Your doctor will tell you when you can have sex again. Diet · Eat smaller meals more often instead of fewer larger meals. You can eat a normal diet, but avoid eating fatty foods for about 1 month. Fatty foods include hamburger, whole milk, cheese, and many snack foods. If your stomach is upset, try bland, low-fat foods like plain rice, broiled chicken, toast, and yogurt. · Drink plenty of fluids (unless your doctor tells you not to). · If you have diarrhea, try avoiding spicy foods, dairy products, fatty foods, and alcohol. You can also watch to see if specific foods cause it, and stop eating them. If the diarrhea continues for more than 2 weeks, talk to your doctor. · You may notice that your bowel movements are not regular right after your surgery. This is common. Try to avoid constipation and straining with bowel movements. You may want to take a fiber supplement every day. If you have not had a bowel movement after a couple of days, ask your doctor about taking a mild laxative. Medicines · Take pain medicines exactly as directed. ¨ If the doctor gave you a prescription medicine for pain, take it as prescribed.  
¨ If you are not taking a prescription pain medicine, take an over-the-counter medicine such as acetaminophen (Tylenol), ibuprofen (Advil, Motrin), or naproxen (Aleve). Read and follow all instructions on the label. ¨ Do not take two or more pain medicines at the same time unless the doctor told you to. Many pain medicines contain acetaminophen, which is Tylenol. Too much Tylenol can be harmful. · If you think your pain medicine is making you sick to your stomach: 
¨ Take your medicine after meals (unless your doctor tells you not to). ¨ Ask your doctor for a different pain medicine. · If your doctor prescribed antibiotics, take them as directed. Do not stop taking them just because you feel better. You need to take the full course of antibiotics. Incision care · If you have strips of tape on the incision, or cut, leave the tape on for a week or until it falls off. · After 24 to 48 hours, wash the area daily with warm, soapy water, and pat it dry. · You may have staples to hold the cut together. Keep them dry until your doctor takes them out. This is usually in 7 to 10 days. · Keep the area clean and dry. You may cover it with a gauze bandage if it weeps or rubs against clothing. Change the bandage every day. Ice · To reduce swelling and pain, put ice or a cold pack on your belly for 10 to 20 minutes at a time. Do this every 1 to 2 hours. Put a thin cloth between the ice and your skin. Follow-up care is a key part of your treatment and safety. Be sure to make and go to all appointments, and call your doctor if you are having problems. Its also a good idea to know your test results and keep a list of the medicines you take. When should you call for help? Call 911 anytime you think you may need emergency care. For example, call if: 
· You passed out (lost consciousness). · You have severe trouble breathing. · You have sudden chest pain and shortness of breath, or you cough up blood. Call your doctor now or seek immediate medical care if: 
· You are sick to your stomach and cannot drink fluids. · You have pain that does not get better when you take your pain medicine. · You have signs of infection, such as: 
¨ Increased pain, swelling, warmth, or redness. ¨ Red streaks leading from the incision. ¨ Pus draining from the incision. ¨ Swollen lymph nodes in your neck, armpits, or groin. ¨ A fever. · Your urine turns dark brown or your stool is light-colored or kassandra-colored. · Your skin or the whites of your eyes turn yellow. · Bright red blood has soaked through a large bandage over your incision. · You have signs of a blood clot, such as: 
¨ Pain in your calf, back of knee, thigh, or groin. ¨ Redness and swelling in your leg or groin. · You have trouble passing urine or stool, especially if you have mild pain or swelling in your lower belly. Watch closely for any changes in your health, and be sure to contact your doctor if: 
· You had a laparoscopic surgery and your shoulder pain lasts more than 24 hours. · You do not have a bowel movement after taking a laxative. Where can you learn more? Go to mascotsecret.be Enter 450 28 984 in the search box to learn more about \"Cholecystectomy: What to Expect at Home. \"  
© 9698-8540 Healthwise, Incorporated. Care instructions adapted under license by [employer] (which disclaims liability or warranty for this information). This care instruction is for use with your licensed healthcare professional. If you have questions about a medical condition or this instruction, always ask your healthcare professional. [place] [address] any warranty or liability for your use of this information. Content Version: 87.4.465777; Current as of: November 14, 2014 Discharge Orders None Changers Announcement We are excited to announce that we are making your provider's discharge notes available to you in Changers.   You will see these notes when they are completed and signed by the physician that discharged you from your recent hospital stay. If you have any questions or concerns about any information you see in Tube2Tone, please call the Health Information Department where you were seen or reach out to your Primary Care Provider for more information about your plan of care. Introducing [facility]! [facility] introduces Tube2Tone patient portal. Now you can access parts of your medical record, email your doctor's office, and request medication refills online. 1. In your internet browser, go to [email or web address] 2. Click on the First Time User? Click Here link in the Sign In box. You will see the New Member Sign Up page. 3. Enter your Tube2Tone Access Code exactly as it appears below. You will not need to use this code after youve completed the sign-up process. If you do not sign up before the expiration date, you must request a new code. · Tube2Tone Access Code: [email or web address] Expires: 9/14/2017  2:26 AM 
 
4. Enter the last four digits of your Social Security Number (xxxx) and Date of Birth (mm/dd/yyyy) as indicated and click Submit. You will be taken to the next sign-up page. 5. Create a Tube2Tone ID. This will be your Tube2Tone login ID and cannot be changed, so think of one that is secure and easy to remember. 6. Create a Tube2Tone password. You can change your password at any time. 7. Enter your Password Reset Question and Answer. This can be used at a later time if you forget your password. 8. Enter your e-mail address. You will receive e-mail notification when new information is available in [address] 9. Click Sign Up. You can now view and download portions of your medical record. 10. Click the Download Summary menu link to download a portable copy of your medical information.  
 
If you have questions, please visit the Frequently Asked Questions section of the LaunchHear. Remember, MyChart is NOT to be used for urgent needs. For medical emergencies, dial 911. Now available from your iPhone and Android! General Information Please provide this summary of care documentation to your next provider. Patient Signature:  ____________________________________________________________ Date:  ____________________________________________________________  
  
Anne Curlin Provider Signature:  ____________________________________________________________ Date:  ____________________________________________________________

## 2017-06-16 NOTE — CONSULTS
GI Consultation Note Felicity Cook)    NAME: Sheryl Russo : 1980 MRN: [id number]   ATTG: PRABHA Pedro MD PCP: None  Date/Time:  2017 11:39 AM  Subjective:   REASON FOR CONSULT:      Victoria Portillo is a 40 y.o.  male who I was asked to see for evaluation of CBD stone. He presented ot ER this early AM following acute onset RUQ/Epig pain yesterday after dinner described as michelle stabbing, but without radiation. His pain was colic-like and persisted with associated N/V. He denies jaundice or dark urine. He had a similar episode 1 mo earlier. He denies F/C, D/C, or ameliorating factors other than narcotic analgesia here in ER. ER eval noted elevated WBC, increased transaminases, and US evidence of cholecystitis and mildly dilated CBD which on f/u MRI noted 4mm CBD stone. He feels better at this time. Past Medical History:   Diagnosis Date    Gall stones       History reviewed. No pertinent surgical history. Social History   Substance Use Topics    Smoking status: Never Smoker    Smokeless tobacco: Not on file    Alcohol use No      History reviewed. No pertinent family history. No Known Allergies   Home Medications:  None     Hospital medications:  Current Facility-Administered Medications   Medication Dose Route Frequency    HYDROmorphone (PF) (DILAUDID) injection 0.5 mg  0.5 mg IntraVENous ONCE     No current outpatient prescriptions on file.      REVIEW OF SYSTEMS:     [x]    Total of 11 systems reviewed as follows:  Const:   negative fever, negative chills, negative weight loss  Eyes:   negative diplopia or visual changes, negative eye pain  ENT:   negative coryza, negative sore throat  Resp:   negative cough, hemoptysis, dyspnea  Cards:  negative for chest pain, palpitations, lower extremity edema  :  negative for frequency, dysuria and hematuria  Skin:   negative for rash and pruritus  Heme:   negative for easy bruising and gum/nose bleeding  MS:  negative for myalgias, arthralgias, back pain and muscle weakness  Neurolo:  negative for headaches, dizziness, vertigo, memory problems   Psych:  negative for feelings of anxiety, depression     Pertinent Positives include :    Objective:   VITALS:    Visit Vitals    /69    Pulse (!) 56    Temp 97.4 °F (36.3 °C)    Resp 16    Ht 6' 3\" (1.905 m)    Wt 132.1 kg (291 lb 3.6 oz)    SpO2 98%    BMI 36.4 kg/m2     Temp (24hrs), Av.4 °F (36.3 °C), Min:97.4 °F (36.3 °C), Max:97.4 °F (36.3 °C)    PHYSICAL EXAM:   General:    Alert, cooperative, no distress, appears stated age. MO     Head:   Normocephalic, without obvious abnormality, atraumatic. Eyes:   Conjunctivae clear, anicteric sclerae. Pupils are equal  Nose:  Nares normal. No drainage or sinus tenderness. Throat:    Lips, mucosa, and tongue normal.  No Thrush  Neck:  Supple, symmetrical,  no adenopathy, thyroid: non tender  Back:    Symmetric,  No CVA tenderness. Lungs:   CTA bilaterally. No wheezing/rhonchi/rales. Chest wall:  No tenderness or deformity. No Accessory muscle use. Heart:   Regular rate and rhythm,  no murmur, rub or gallop. Abdomen:   Soft, +Epig tender. Not distended. NABS. No masses  Extremities: Atraumatic, No cyanosis. No edema. No clubbing  Skin:     Texture, turgor normal. No rashes/lesions/jaundice  Lymph: Cervical, supraclavicular normal.  Psych:  Good insight. Not depressed. Not anxious or agitated. Neurologic: EOMs intact. Normal  strength, A/O X 3.      LAB DATA REVIEWED:    Recent Results (from the past 48 hour(s))   CBC WITH AUTOMATED DIFF    Collection Time: 17  3:55 AM   Result Value Ref Range    WBC 15.9 (H) 4.1 - 11.1 K/uL    RBC 5.65 4.10 - 5.70 M/uL    HGB 15.9 12.1 - 17.0 g/dL    HCT 46.5 36.6 - 50.3 %    MCV 82.3 80.0 - 99.0 FL    MCH 28.1 26.0 - 34.0 PG    MCHC 34.2 30.0 - 36.5 g/dL    RDW 13.4 11.5 - 14.5 %    PLATELET 211 358 - 405 K/uL    NEUTROPHILS 82 (H) 32 - 75 %    LYMPHOCYTES 10 (L) 12 - 49 %    MONOCYTES 8 5 - 13 % EOSINOPHILS 0 0 - 7 %    BASOPHILS 0 0 - 1 %    ABS. NEUTROPHILS 13.0 (H) 1.8 - 8.0 K/UL    ABS. LYMPHOCYTES 1.6 0.8 - 3.5 K/UL    ABS. MONOCYTES 1.2 (H) 0.0 - 1.0 K/UL    ABS. EOSINOPHILS 0.1 0.0 - 0.4 K/UL    ABS. BASOPHILS 0.0 0.0 - 0.1 K/UL   METABOLIC PANEL, COMPREHENSIVE    Collection Time: 06/16/17  3:55 AM   Result Value Ref Range    Sodium 139 136 - 145 mmol/L    Potassium 3.3 (L) 3.5 - 5.1 mmol/L    Chloride 104 97 - 108 mmol/L    CO2 27 21 - 32 mmol/L    Anion gap 8 5 - 15 mmol/L    Glucose 233 (H) 65 - 100 mg/dL    BUN 14 6 - 20 MG/DL    Creatinine 1.20 0.70 - 1.30 MG/DL    BUN/Creatinine ratio 12 12 - 20      GFR est AA >60 >60 ml/min/1.73m2    GFR est non-AA >60 >60 ml/min/1.73m2    Calcium 9.3 8.5 - 10.1 MG/DL    Bilirubin, total 1.0 0.2 - 1.0 MG/DL    ALT (SGPT) 100 (H) 12 - 78 U/L    AST (SGOT) 102 (H) 15 - 37 U/L    Alk. phosphatase 79 45 - 117 U/L    Protein, total 8.1 6.4 - 8.2 g/dL    Albumin 4.1 3.5 - 5.0 g/dL    Globulin 4.0 2.0 - 4.0 g/dL    A-G Ratio 1.0 (L) 1.1 - 2.2     LIPASE    Collection Time: 06/16/17  3:55 AM   Result Value Ref Range    Lipase 125 73 - 393 U/L     IMAGING RESULTS:   [x]      I have personally reviewed the actual   []    CXR  [x]    MRCP  []     US  MRI ABD W MRCP WO CONT 6/16/17  FINDINGS: Liver: Signal is within normal limits. No evidence of mass. Biliary tree: The gallbladder is distended and contains multiple stones. There  is a small amount of pericholecystic fluid. Gallbladder wall mildly thickened  common duct measures 7 to 8 mm and there is a 4 mm stone in the distal duct. Pancreas: Signal is within normal limits. No evidence of mass or surrounding  inflammation. Pancreatic duct is within normal limits. No divisum. Spleen: Within normal limits. Adrenal glands: Within normal limits. Kidneys: Enhance symmetrically. No mass or hydronephrosis. Vasculature: Patent. Bowel: No evidence of inflammation. Lymph nodes: No lymphadenopathy.   Miscellaneous: No ascites. Bones are within normal limits. IMPRESSION:   1. Distended gallbladder with pericholecystic fluid and stones  2. Mildly distended common duct with 4 mm stone within the distal duct      Recommendations/Plan:      Active Problems:    * No active hospital problems. *     ___________________________________________________  RECOMMENDATIONS:    45yo M with epig/RUQ pain associated with N/V, leukocytosis, elevated LFTs, and radiographic evidence of choledocholithiasis with CBD dil and cholecystitis.     Plan:  1) ERCP today- risks and benefits explained in company of pt and family  2) IVF  3) IV abx as Zosyn  4) IV anagesics and antiemetics PRN  5) Cholecystectomy this hospitalization  6) Follow LFTs and CBC  7) NPO for now  ___________________________________________________  Care Plan discussed with:    [x]    Patient   [x]    Family   [x]    Nursing   [x]    Attending-[place]  Total Time : 50  minutes   ___________________________________________________  GI: Vickie Wilhelm MD

## 2017-06-16 NOTE — OP NOTES
OPERATION        DATE OF OPERATION:  6/16/2017    PREOPERATIVE DIAGNOSIS:  Cholecystitis with cholelithiasis    POSTOPERATIVE DIAGNOSIS:  same as preop    PROCEDURE:   Laparoscopic Cholecystectomy    SURGEON:  Thompson Salcido MD, FACS. ANESTHESIA:  General Endotracheal Anesthesia    FINDINGS:  Cholecystitis with cholelithiasis    SPECIMENS REMOVED:  Gallbladder and Contents    DESCRIPTION OF OPERATION:  After appropriate consent was obtained, patient who was competent was brought to the operating room, made comfortable in a supine position, and administered general endotracheal anesthesia. The patient was prepped and draped in standard fashion. A 0.5% plain Marcaine solution was used to infiltrate the skin at the trocar sites. Fifteen blade was then used to incise of the umbilicus. This was extended sharply down through the midline fascia. A Jama trocar was placed and the abdominal cavity was insufflated with CO2 gas to 15 cm water pressure. Under direct visualization, two 5 mm trocars were placed in the upper abdomen. Using standard laparoscopic technique, the abdominal cavity was explored. The gallbladder was identified and grasped at the fundus and elevated over the inferior edge of the liver. The neck of the gallbladder was retracted laterally. The peritoneum was stripped down over Calot's triangle and the cystic duct and cystic artery were identified. These structures were freed up proximally and distally, ligated with clips, and divided between clips. The gallbladder was then reflected off the   gallbladder fossa with the Bovie hook electrocautery and once it was free, it was placed in the endoscopic retrieval bag and withdrawn from the abdomen through the umbilical trocar site. The gallbladder fossa was inspected. Hemostasis was confirmed. The trocars were removed under direct visualization with no evidence of bleeding. CO2 gas was fully desufflated from the abdominal cavity.  The umbilical fascia defect was approximated with 0 Vicryl suture. Skin incisions were closed with 5-0 Monocryl subcuticular stitch. The wounds were cleaned and dried and dressed with Dermabond and the patient was awakened and extubated and transferred to the recovery room in good condition. There were no complications and minimal blood loss during the case. Breonna Mims.  Mary Neely MD, [facility]

## 2017-06-16 NOTE — IP AVS SNAPSHOT
Current Discharge Medication List  
  
START taking these medications Dose & Instructions Dispensing Information Comments Morning Noon Evening Bedtime HYDROcodone-acetaminophen 5-325 mg per tablet Commonly known as:  Stephania Ge Your last dose was: Your next dose is:    
   
   
 Dose:  1-2 Tab Take 1-2 Tabs by mouth every four (4) hours as needed for Pain. Max Daily Amount: 12 Tabs. Quantity:  30 Tab Refills:  0 Where to Get Your Medications Information on where to get these meds will be given to you by the nurse or doctor. ! Ask your nurse or doctor about these medications HYDROcodone-acetaminophen 5-325 mg per tablet

## 2017-06-16 NOTE — BRIEF OP NOTE
BRIEF OPERATIVE NOTE    Date of Procedure: 6/16/2017   Preoperative Diagnosis: CHOLEDOCHOLITHIASIS WITH CHOLELITHIASIS  Postoperative Diagnosis: CHOLEDOCHOLITHIASIS WITH CHOLELITHIASIS    Procedure(s):  CHOLECYSTECTOMY LAPAROSCOPIC  ENDOSCOPIC RETROGRADE CHOLANGIOPANCREATOGRAPHY  Surgeon(s) and Role:  Panel 1:     * Emdund Sánchez MD - Primary    Panel 2:     * Patricia Hardy MD - Primary         Assistant Staff:       Surgical Staff:  Circ-1: Kenia Casas RN  Endoscopy Technician-1: Cody Quezada  Radiology Technician: RT Devin  Scrub Tech-1: Walter Ross  Surg Asst-1: Tova Levy  Endoscopy RN-1: Jose Raul Cline RN  Endoscopy RN-2: Nadeen Juarez RN  Event Time In   Incision Start 1504   Incision Close      Anesthesia: General   Estimated Blood Loss: < 50 cc  Specimens:   ID Type Source Tests Collected by Time Destination   1 : GALLBLADDER Preservative Gallbladder  Edmund Sánchez MD 6/16/2017 1656 Pathology      Findings: severe acute cholecystitis   Complications: none  Implants: * No implants in log *

## 2017-06-16 NOTE — PROCEDURES
NAME:  Emelina Anders   :   1980   MRN:   [id number]     [facility] Sl[facility]  Date/Time:  2017 4:31 PM    Procedure Type:   ERCP with biliary sphincterotomy, biliary stone removal     Indications: abnormal CT/MRCP, common bile duct stone, biliary obstruction  Pre-operative Diagnosis: see indication above  Post-operative Diagnosis:  See findings below  : Mary Valdez MD  Referring Provider:    Reno Guerra MD; Clifton Rhodes MD  Exam:  Airway: clear, no airway problems anticipated  Heart: RRR, without gallops or rubs  Lungs: clear bilaterally without wheezes, crackles, or rhonchi  Abdomen: soft, nontender, nondistended, bowel sounds present  Mental Status: awake, alert and oriented to person, place and time    Sedation:  General anesthesia  Procedure Details:  After informed consent was obtained with all risks and benefits of procedure explained, the patient was taken to the fluoroscopy suite and placed in the prone position. Upon sequential sedation as per above, the Olympus duodenoscope [id number]   was inserted via the mouthpeice and carefully advanced to the second portion of the duodenum. The quality of visualization was adequate. The duodenoscope was withdrawn into a short position. Findings:   Endoscopic: -food in stomach  Ampulla:small inflammed papilla, suggestive of recent stone passage  Cholangiogram: -minimally dilated with evidence of retained filling defect in mid CBD consistent with stone  Pancreatogram:not performed    Specimen Removed:  None  Complications: None. EBL:  None. Interventions:    Pancreatic: none  Biliary: -Initial attempts at wire-guided canullation in short position using sphinctertome with Dreamwire and cannula with Glidewire unsuccessful, with repeated accessing of the pancreatic duct. Converted to needle-knife sphinctertome with free-hand sphincterotomy performed from apex of the inflamed diminutive papillary orifice in a 11 o'clock direction. We then gained access to the CBD with wire and converted to Dreamtome sphincterotome after confirming access to the common bille duct fluoroscopically and with bile aspirate. Contrast injected demonstrated a minimally dilated duct with 4mm stone in the mid portion. The sphinctertotomy was then extended in wire-guided manner and allowed for ready discharge of thick fluid suggestive of pus. Exchange was performed for 9-12mm biliary stone balloon advance to level of the CHD bifurcation. The balloon was pulled though the sphincterotomy, fully inflated with expulsion of single 4-5mm stone and thick debris/sludge. This was repeated 4 times with final theree passes showing no further debris in the bile duct and that the bile duct on expulsion of the stone no longer appeared dilated and that contrast drained readily. The endoscoic apparatus was removed and the pateint was readied for cholecystectomy      Impression:    -food in stomach  -small inflammed papilla, suggestive of recent stone passage  -Initial attempts at wire-guided canullation in short position using sphinctertome with Dreamwire and cannula with Glidewire unsuccessful, with repeated accessing of the pancreatic duct. Converted to needle-knife sphinctertome with free-hand sphincterotomy performed from apex of the inflamed diminutive papillary orifice in a 11 o'clock direction. We then gained access to the CBD with wire and converted to Dreamtome sphincterotome after confirming access to the common bille duct fluoroscopically and with bile aspirate. Contrast injected demonstrated a minimally dilated duct with 4mm stone in the mid portion. The sphinctertotomy was then extended in wire-guided manner and allowed for ready discharge of thick fluid suggestive of pus. Exchange was performed for 9-12mm biliary stone balloon advance to level of the CHD bifurcation.   The balloon was pulled though the sphincterotomy, fully inflated with expulsion of single 4-5mm stone and thick debris/sludge. This was repeated 4 times with final theree passes showing no further debris in the bile duct and that the bile duct on expulsion of the stone no longer appeared dilated and that contrast drained readily.   The endoscoic apparatus was removed and the pateint was readied for cholecystectomy    Recommendations:    -Keep NPO until AM.   - NO aspirin for 10 days    -IVF  -Follow lipase, LFTs, CBC      Discharge Disposition:  Proceed to cholecystectomy    Fredi Hollis MD

## 2017-06-16 NOTE — PROGRESS NOTES
Pharmacy Clarification of Prior to Admission Medication Regimen     The patient was interviewed regarding clarification of the prior to admission medication regimen. Mom was present in room and obtained permission from patient to discuss drug regimen with visitor(s) present. Patient was questioned regarding use of any other inhalers, topical products, over the counter medications, herbal medications, vitamin products or ophthalmic/nasal/otic medication use. Information Obtained From: Patient    Pertinent Pharmacy Findings:   Updated patients preferred outpatient pharmacy to: [facility] [address] , [address] [place] [address] RD.  Patient is currently not taking any prescription or OTC medications. The patient stated the took 'something like Gas-X, but stronger' about a month ago 'but it didn't work'.  Patient was in MRI when pharmacy first attempted to interview.     PTA medication list was corrected to the following:     None          Thank you,  Amanuel Miramontes CPhT  Medication History Pharmacy Technician

## 2017-06-16 NOTE — H&P
Surgery History and Physcial    Subjective:      Black López is a 40 y.o. male who presents for evaluation of abdominal pain. The pain is located in the RUQ without radiation. Pain is described as sharp and stabbing and measures 8/10 in intensity. Onset of pain was last night and he had one similar episode a month ago. Aggravating factors include eating. Alleviating factors include none. Associated symptoms include anorexia, nausea, sweats and vomiting. Patient Active Problem List    Diagnosis Date Noted    Choledocholithiasis with cholecystitis 06/16/2017     Past Medical History:   Diagnosis Date    Gall stones       History reviewed. No pertinent surgical history. Social History   Substance Use Topics    Smoking status: Never Smoker    Smokeless tobacco: Not on file    Alcohol use No      History reviewed. No pertinent family history. Prior to Admission medications    Not on File     No Known Allergies      Review of Systems   Constitutional: Positive for appetite change and diaphoresis. Negative for chills and fever. Respiratory: Negative for shortness of breath and wheezing. Cardiovascular: Negative for chest pain and palpitations. Gastrointestinal: Positive for abdominal pain, nausea and vomiting. Negative for diarrhea. Musculoskeletal: Negative for myalgias. Hematological: Does not bruise/bleed easily. Objective:     Visit Vitals    /78 (BP 1 Location: Right arm, BP Patient Position: At rest)    Pulse 61    Temp 98.3 °F (36.8 °C)    Resp 16    Ht 6' 3\" (1.905 m)    Wt 291 lb 3.6 oz (132.1 kg)    SpO2 100%    BMI 36.4 kg/m2       Physical Exam   Constitutional: He appears well-developed and well-nourished. No distress. HENT:   Head: Normocephalic and atraumatic. Cardiovascular: Normal rate, regular rhythm, normal heart sounds and intact distal pulses. Pulmonary/Chest: Breath sounds normal. He has no wheezes. He has no rales. Abdominal: Soft.  Bowel sounds are normal. He exhibits no distension and no mass. There is no hepatosplenomegaly. There is tenderness. There is positive Neumann's sign. There is no rigidity, no rebound, no guarding and no tenderness at McBurney's point. No hernia. Musculoskeletal: Normal range of motion. Lymphadenopathy:     He has no cervical adenopathy. Imaging:  images and reports reviewed    Lab Review:    Recent Results (from the past 24 hour(s))   CBC WITH AUTOMATED DIFF    Collection Time: 06/16/17  3:55 AM   Result Value Ref Range    WBC 15.9 (H) 4.1 - 11.1 K/uL    RBC 5.65 4.10 - 5.70 M/uL    HGB 15.9 12.1 - 17.0 g/dL    HCT 46.5 36.6 - 50.3 %    MCV 82.3 80.0 - 99.0 FL    MCH 28.1 26.0 - 34.0 PG    MCHC 34.2 30.0 - 36.5 g/dL    RDW 13.4 11.5 - 14.5 %    PLATELET 415 727 - 580 K/uL    NEUTROPHILS 82 (H) 32 - 75 %    LYMPHOCYTES 10 (L) 12 - 49 %    MONOCYTES 8 5 - 13 %    EOSINOPHILS 0 0 - 7 %    BASOPHILS 0 0 - 1 %    ABS. NEUTROPHILS 13.0 (H) 1.8 - 8.0 K/UL    ABS. LYMPHOCYTES 1.6 0.8 - 3.5 K/UL    ABS. MONOCYTES 1.2 (H) 0.0 - 1.0 K/UL    ABS. EOSINOPHILS 0.1 0.0 - 0.4 K/UL    ABS. BASOPHILS 0.0 0.0 - 0.1 K/UL   METABOLIC PANEL, COMPREHENSIVE    Collection Time: 06/16/17  3:55 AM   Result Value Ref Range    Sodium 139 136 - 145 mmol/L    Potassium 3.3 (L) 3.5 - 5.1 mmol/L    Chloride 104 97 - 108 mmol/L    CO2 27 21 - 32 mmol/L    Anion gap 8 5 - 15 mmol/L    Glucose 233 (H) 65 - 100 mg/dL    BUN 14 6 - 20 MG/DL    Creatinine 1.20 0.70 - 1.30 MG/DL    BUN/Creatinine ratio 12 12 - 20      GFR est AA >60 >60 ml/min/1.73m2    GFR est non-AA >60 >60 ml/min/1.73m2    Calcium 9.3 8.5 - 10.1 MG/DL    Bilirubin, total 1.0 0.2 - 1.0 MG/DL    ALT (SGPT) 100 (H) 12 - 78 U/L    AST (SGOT) 102 (H) 15 - 37 U/L    Alk.  phosphatase 79 45 - 117 U/L    Protein, total 8.1 6.4 - 8.2 g/dL    Albumin 4.1 3.5 - 5.0 g/dL    Globulin 4.0 2.0 - 4.0 g/dL    A-G Ratio 1.0 (L) 1.1 - 2.2     LIPASE    Collection Time: 06/16/17  3:55 AM   Result Value Ref Range    Lipase 125 73 - 393 U/L         Assessment:     Abdominal pain, suspect acute calculous cholecystitis with choledocholithiasis. Plan:     1. I recommend proceeding with lap cholecystecomy and ERCP. Dr. Aden Grimaldo agrees with plan. Zosyn started in ED. 2. Discussed aspects of surgical intervention, methods, risks including by not limited to infection, bleeding, hematoma, and perforation of the intestines or solid organs, and the risks of general anesthetic. The patient understands the risks; any and all questions were answered to the patient's satisfaction.     Signed By: Edmund Sánchez MD, [facility]    June 16, 2017

## 2017-06-16 NOTE — ED NOTES
TRANSFER - OUT REPORT:    Verbal report given to [facility], (name) on Irasema Jackson  being transferred to Preop(unit) for ordered procedure  Report consisted of patients Situation, Background, Assessment and   Recommendations(SBAR). Information from the following report(s) SBAR, ED Summary, Intake/Output and MAR was reviewed with the receiving nurse. Lines:   Peripheral IV 06/16/17 Right Antecubital (Active)   Site Assessment Clean, dry, & intact 6/16/2017  4:04 AM   Phlebitis Assessment 0 6/16/2017  4:04 AM   Infiltration Assessment 0 6/16/2017  4:04 AM   Dressing Status Clean, dry, & intact 6/16/2017  4:04 AM   Dressing Type Tape;Transparent 6/16/2017  4:04 AM   Hub Color/Line Status Pink 6/16/2017  4:04 AM        Opportunity for questions and clarification was provided.       Patient transported with:   Kamini Lam

## 2017-06-16 NOTE — ED PROVIDER NOTES
HPI Comments: eMna Monroy, 40 y.o. Male with PMHx of Heart Murmur, diagnosed with gallstones on 5/18/17, presents to ED [facility] ED after a fatty meal tonight with gradual worsening of sxs. He notes having had some abdominal discomfort at dinner time tonight but he ate dinner with no problem. He reports nausea and epigastric abdominal pain which began around 10:30 PM. He had 1 episode of vomiting while in his ED room. He reports having had similar pain last month and was dx with gallstones. He has not followed up with a surgeon because he had been feeling better. PCP: None    Social history significant for: - Tobacco, - EtOH, - Illicit Drug Use    There are no other complaints, changes, or physical findings at this time. The history is provided by the patient and a parent. Past Medical History:   Diagnosis Date    Gall stones        History reviewed. No pertinent surgical history. History reviewed. No pertinent family history. Social History     Social History    Marital status: SINGLE     Spouse name: N/A    Number of children: N/A    Years of education: N/A     Occupational History    Not on file. Social History Main Topics    Smoking status: Never Smoker    Smokeless tobacco: Not on file    Alcohol use No    Drug use: No    Sexual activity: Not on file     Other Topics Concern    Not on file     Social History Narrative    No narrative on file         ALLERGIES: Review of patient's allergies indicates no known allergies. Review of Systems   Constitutional: Negative for appetite change, chills and fever. HENT: Negative. Negative for congestion, rhinorrhea, sneezing and sore throat. Eyes: Negative. Negative for redness and visual disturbance. Respiratory: Negative. Negative for cough, shortness of breath and wheezing. Cardiovascular: Negative. Negative for chest pain and leg swelling. Gastrointestinal: Positive for abdominal pain, nausea and vomiting. Negative for diarrhea. Genitourinary: Negative. Negative for difficulty urinating, discharge and frequency. Musculoskeletal: Negative. Negative for arthralgias, back pain, myalgias and neck stiffness. Skin: Negative. Negative for color change and rash. Neurological: Negative. Negative for dizziness, syncope, weakness, numbness and headaches. Hematological: Negative for adenopathy. Psychiatric/Behavioral: Negative. All other systems reviewed and are negative. Patient Vitals for the past 12 hrs:   Temp Pulse Resp BP SpO2   06/16/17 1146 98.3 °F (36.8 °C) 61 - 143/78 100 %   06/16/17 0645 - - - 130/69 98 %   06/16/17 0415 - - - 126/61 100 %   06/16/17 0400 - - - 138/71 100 %   06/16/17 0229 97.4 °F (36.3 °C) (!) 56 16 138/71 100 %            Physical Exam   Constitutional: He is oriented to person, place, and time. He appears distressed. active emesis in progress during history    HENT:   Head: Atraumatic. Eyes: EOM are normal.   Cardiovascular: Normal rate, regular rhythm, normal heart sounds and intact distal pulses. Exam reveals no gallop and no friction rub. No murmur heard. Pulmonary/Chest: Effort normal and breath sounds normal. No respiratory distress. He has no wheezes. He has no rales. He exhibits no tenderness. Abdominal: Soft. Bowel sounds are normal. He exhibits no distension and no mass. There is tenderness. There is no rebound and no guarding. Musculoskeletal: Normal range of motion. He exhibits no edema or tenderness. Neurological: He is alert and oriented to person, place, and time. Psychiatric: He has a normal mood and affect. Nursing note and vitals reviewed. MDM  Number of Diagnoses or Management Options  Acute cholecystitis:   Diagnosis management comments: known gallstones with likely flare secondary to steak dinner tonight. will evaluate for transaminitis for possible ductile dilatation for stone.  In addition, will evaluate for pancreatitis given epigastric pain, but acute cholecystitis is more likely. Amount and/or Complexity of Data Reviewed  Clinical lab tests: reviewed and ordered  Tests in the radiology section of CPT®: ordered and reviewed  Review and summarize past medical records: yes  Discuss the patient with other providers: yes (General Surgery)    Patient Progress  Patient progress: stable    ED Course       Procedures     Progress Note:  4:37 AM  Pt reports a mild improvement in his pain. Written by Nestor Luis ED Scribe, as dictated by Sixto Boucher MD.    Progress Note:  5:12 AM  Was just reassesed, his pain is esclating, will redose pain medication. Awaiting outside hospital record. Will likely need repeat US. Written by ALICIA Mathewsiboswaldo, as dictated by Sixto Boucher MD.    Progress Note:  5:58 AM  His  pain is signfically improved. informed him that we are trying to obtain outside hospital records and that leaning towards repeating US at 7 AM when Kiley arrives. Written by ALICIA Mathews, as dictated by Sixto Boucher MD.    SIGN OUT:  7:47 AM  Patient's presentation, labs/imaging and plan of care was reviewed with Dr. Simran Jain as part of sign out. They will consult with general surgery and admit or discharge per general surgery's  recomendation as part of the plan discussed with the patient. Dr. Tito Chang assistance in completion of this plan is greatly appreciated but it should be noted that I will be the provider of record for this patient. Sixto Boucher MD    CONSULT NOTE:   8:54 AM  Candy Campbell MD spoke with Dr. Tru Gaitan,  Specialty: General Surgery  Discussed pt's hx, disposition, and available diagnostic and imaging results. Reviewed care plans. Consultant agrees with plans as outlined. He requests MRCP at this time.    Written by ALICIA Craft, as dictated by Candy Campbell MD.    Progress Note:  8:55 AM  Called MRI to request that the scan be done urgently. Written by Christin Garcia, ED Scribe, as dictated by Drerell Laboy MD.    EKG interpretation: (Preliminary)  1:12 PM  Rhythm: sinus bradycardia; and irregular. Rate (approx.): 51; Axis: normal; NE interval: normal; QRS interval: normal ; ST/T wave: normal; Other findings: normal.  Written by Juanito Menard ED Scribe as dictated by Ema Jimenez MD    LABORATORY TESTS:  Recent Results (from the past 12 hour(s))   CBC WITH AUTOMATED DIFF    Collection Time: 06/16/17  3:55 AM   Result Value Ref Range    WBC 15.9 (H) 4.1 - 11.1 K/uL    RBC 5.65 4.10 - 5.70 M/uL    HGB 15.9 12.1 - 17.0 g/dL    HCT 46.5 36.6 - 50.3 %    MCV 82.3 80.0 - 99.0 FL    MCH 28.1 26.0 - 34.0 PG    MCHC 34.2 30.0 - 36.5 g/dL    RDW 13.4 11.5 - 14.5 %    PLATELET 019 419 - 392 K/uL    NEUTROPHILS 82 (H) 32 - 75 %    LYMPHOCYTES 10 (L) 12 - 49 %    MONOCYTES 8 5 - 13 %    EOSINOPHILS 0 0 - 7 %    BASOPHILS 0 0 - 1 %    ABS. NEUTROPHILS 13.0 (H) 1.8 - 8.0 K/UL    ABS. LYMPHOCYTES 1.6 0.8 - 3.5 K/UL    ABS. MONOCYTES 1.2 (H) 0.0 - 1.0 K/UL    ABS. EOSINOPHILS 0.1 0.0 - 0.4 K/UL    ABS. BASOPHILS 0.0 0.0 - 0.1 K/UL   METABOLIC PANEL, COMPREHENSIVE    Collection Time: 06/16/17  3:55 AM   Result Value Ref Range    Sodium 139 136 - 145 mmol/L    Potassium 3.3 (L) 3.5 - 5.1 mmol/L    Chloride 104 97 - 108 mmol/L    CO2 27 21 - 32 mmol/L    Anion gap 8 5 - 15 mmol/L    Glucose 233 (H) 65 - 100 mg/dL    BUN 14 6 - 20 MG/DL    Creatinine 1.20 0.70 - 1.30 MG/DL    BUN/Creatinine ratio 12 12 - 20      GFR est AA >60 >60 ml/min/1.73m2    GFR est non-AA >60 >60 ml/min/1.73m2    Calcium 9.3 8.5 - 10.1 MG/DL    Bilirubin, total 1.0 0.2 - 1.0 MG/DL    ALT (SGPT) 100 (H) 12 - 78 U/L    AST (SGOT) 102 (H) 15 - 37 U/L    Alk.  phosphatase 79 45 - 117 U/L    Protein, total 8.1 6.4 - 8.2 g/dL    Albumin 4.1 3.5 - 5.0 g/dL    Globulin 4.0 2.0 - 4.0 g/dL    A-G Ratio 1.0 (L) 1.1 - 2.2     LIPASE    Collection Time: 06/16/17  3:55 AM   Result Value Ref Range    Lipase 125 73 - 393 U/L       IMAGING RESULTS:  EXAM: US ABD LTD  Indication: Mid abdominal pain with history of gallstones, nausea     Real-time sonographic imaging of the right upper quadrant was performed. The  liver is normal in appearance without evidence of mass lesion or biliary  dilatation. Gallstones are noted in a distended gallbladder. The gallbladder  wall is thickened measuring 9 mm. Neumann's sign cannot be evaluated given pain  medicine. Common bile duct is mildly dilated measuring 8 mm. The right kidney  is normal in size and appearance without evidence of mass lesion,  hydronephrosis, or calcification. The pancreas is not visualized due to bowel  gas. No free fluid.     IMPRESSION  Impression: Cholelithiasis with gallbladder wall thickening compatible with  acute cholecystitis. Common bile duct dilatation is concerning for  Choledocholithiasis. EXAM: MRI ABD W MRCP WO CONT  INDICATION: Gallstones and dilated common duct with right upper quadrant pain  COMPARISON: Ultrasound same day  TECHNIQUE: Coronal T2 and postcontrast T1; Axial T2, in/out of phase, MRCP, . FINDINGS: Liver: Signal is within normal limits. No evidence of mass. Biliary tree: The gallbladder is distended and contains multiple stones. There  is a small amount of pericholecystic fluid. Gallbladder wall mildly thickened  common duct measures 7 to 8 mm and there is a 4 mm stone in the distal duct. Pancreas: Signal is within normal limits. No evidence of mass or surrounding  inflammation. Pancreatic duct is within normal limits. No divisum. Spleen: Within normal limits. Adrenal glands: Within normal limits. Kidneys: Enhance symmetrically. No mass or hydronephrosis. Vasculature: Patent. Bowel: No evidence of inflammation. Lymph nodes: No lymphadenopathy. Miscellaneous: No ascites. Bones are within normal limits.     IMPRESSION  IMPRESSION:   1. Distended gallbladder with pericholecystic fluid and stones  2. Mildly distended common duct with 4 mm stone within the distal duct    MEDICATIONS GIVEN:  Medications   piperacillin-tazobactam (ZOSYN) 4.5 g in 0.9% sodium chloride (MBP/ADV) 100 mL (not administered)   iothalamate meglumine (CONRAY 60) 60 % contrast solution (not administered)   sodium chloride 0.9 % bolus infusion 1,000 mL (0 mL IntraVENous IV Completed 6/16/17 0715)   ondansetron (ZOFRAN) injection 4 mg (4 mg IntraVENous Given 6/16/17 0356)   HYDROmorphone (PF) (DILAUDID) injection 1 mg (1 mg IntraVENous Given 6/16/17 0356)   HYDROmorphone (PF) (DILAUDID) injection 2 mg (2 mg IntraVENous Given 6/16/17 0526)   0.9% sodium chloride infusion (150 mL/hr IntraVENous New Bag 6/16/17 0850)   piperacillin-tazobactam (ZOSYN) 4.5 g in 0.9% sodium chloride (MBP/ADV) 100 mL (4.5 g IntraVENous New Bag 6/16/17 0850)   HYDROmorphone (PF) (DILAUDID) injection 0.5 mg (0.5 mg IntraVENous Given 6/16/17 1148)       IMPRESSION:  1. Acute cholecystitis        PLAN: Admit    ADMIT NOTE:  8:54 AM  Patient is being admitted to the hospital by Dr. Higinio Villegas. The results of their tests and reasons for their admission have been discussed with them and/or available family. They convey agreement and understanding for the need to be admitted and for their admission diagnosis. Consultation has been made with the inpatient physician specialist for hospitalization. This note is prepared by Sangeetha Zuniga acting as Scribe for MD Cheryl Velázquez MD: The scribe's documentation has been prepared under my direction and personally reviewed by me in its entirety. I confirm that the note above accurately reflects all work, treatment, procedures, and medical decision making performed by me. This note is prepared by Keely Burgos, acting as Scribe for Chan العراقي MD.    Chan العراقي MD: The scribe's documentation has been prepared under my direction and personally reviewed by me in its entirety.  I confirm that the note above accurately reflects all work, treatment, procedures, and medical decision making performed by me.

## 2017-06-16 NOTE — PERIOP NOTES
TRANSFER - IN REPORT:    Verbal report received from Andi Fiore (name) on Donnis Simmonds  being received from Emergency Room (unit) for ordered procedure      Report consisted of patients Situation, Background, Assessment and   Recommendations(SBAR). Information from the following report(s) SBAR, Kardex, [facility] and Recent Results was reviewed with the receiving nurse. Opportunity for questions and clarification was provided. Assessment completed upon patients arrival to unit and care assumed. [facility] to obtain urine if patient can void, due to no urine obtained since 0245 for UA/C&S and EKG per anesthesia order placed in system.

## 2017-06-16 NOTE — PROGRESS NOTES
Pharmacy Automatic Renal Dosing Protocol - Antimicrobials    Indication for Antimicrobials: intra-abdominal infection (suspect acute calculous cholecystitis with choledocholithiasis)    Current Regimen of Each Antimicrobial (Start Day & Day of Therapy):  Zosyn 4.5 gm every 8 hr ( - day # 1)    Significant Cultures:   Abd ultrasound  - Common bile duct dilatation is concerning for choledocholithiasis    CAPD, Hemodialysis or Renal Replacement Therapy: no   Paralysis, amputations, malnutrition: no  Recent Labs      17   0355   CREA  1.20   BUN  14   WBC  15.9*     Temp (24hrs), Av.2 °F (36.8 °C), Min:97.4 °F (36.3 °C), Max:98.8 °F (37.1 °C)    Creatinine Clearance (Creatinine Clearance (ml/min)): 101 ml/min     Impression/Plan:   -zosyn 4.5 gm IV every 8 hours for suspected intra-abdominal infection and surgical prophylaxis  -monitor and de-escalate antibiotic coverage as appropriate       Pharmacy will follow daily and adjust medications as appropriate for renal function and/or serum levels.     Thank you,  Thierno Jordan, PHARMD     Renal Dosing Tables on [employer]

## 2017-06-17 LAB
ALBUMIN SERPL BCP-MCNC: 3.1 G/DL (ref 3.5–5)
ALBUMIN/GLOB SERPL: 0.9 {RATIO} (ref 1.1–2.2)
ALP SERPL-CCNC: 107 U/L (ref 45–117)
ALT SERPL-CCNC: 491 U/L (ref 12–78)
ANION GAP BLD CALC-SCNC: 6 MMOL/L (ref 5–15)
AST SERPL W P-5'-P-CCNC: 290 U/L (ref 15–37)
ATRIAL RATE: 51 BPM
BASOPHILS # BLD AUTO: 0 K/UL (ref 0–0.1)
BASOPHILS # BLD: 0 % (ref 0–1)
BILIRUB SERPL-MCNC: 1.9 MG/DL (ref 0.2–1)
BUN SERPL-MCNC: 7 MG/DL (ref 6–20)
BUN/CREAT SERPL: 7 (ref 12–20)
CALCIUM SERPL-MCNC: 8.2 MG/DL (ref 8.5–10.1)
CALCULATED P AXIS, ECG09: 6 DEGREES
CALCULATED R AXIS, ECG10: 14 DEGREES
CALCULATED T AXIS, ECG11: 13 DEGREES
CHLORIDE SERPL-SCNC: 108 MMOL/L (ref 97–108)
CO2 SERPL-SCNC: 25 MMOL/L (ref 21–32)
CREAT SERPL-MCNC: 1 MG/DL (ref 0.7–1.3)
DIAGNOSIS, 93000: NORMAL
EOSINOPHIL # BLD: 0 K/UL (ref 0–0.4)
EOSINOPHIL NFR BLD: 0 % (ref 0–7)
ERYTHROCYTE [DISTWIDTH] IN BLOOD BY AUTOMATED COUNT: 13.7 % (ref 11.5–14.5)
GLOBULIN SER CALC-MCNC: 3.6 G/DL (ref 2–4)
GLUCOSE SERPL-MCNC: 107 MG/DL (ref 65–100)
HCT VFR BLD AUTO: 42.6 % (ref 36.6–50.3)
HGB BLD-MCNC: 14.5 G/DL (ref 12.1–17)
LIPASE SERPL-CCNC: 96 U/L (ref 73–393)
LYMPHOCYTES # BLD AUTO: 12 % (ref 12–49)
LYMPHOCYTES # BLD: 1.3 K/UL (ref 0.8–3.5)
MAGNESIUM SERPL-MCNC: 2.1 MG/DL (ref 1.6–2.4)
MCH RBC QN AUTO: 28.3 PG (ref 26–34)
MCHC RBC AUTO-ENTMCNC: 34 G/DL (ref 30–36.5)
MCV RBC AUTO: 83 FL (ref 80–99)
MONOCYTES # BLD: 0.9 K/UL (ref 0–1)
MONOCYTES NFR BLD AUTO: 8 % (ref 5–13)
NEUTS SEG # BLD: 8.8 K/UL (ref 1.8–8)
NEUTS SEG NFR BLD AUTO: 80 % (ref 32–75)
P-R INTERVAL, ECG05: 140 MS
PLATELET # BLD AUTO: 187 K/UL (ref 150–400)
POTASSIUM SERPL-SCNC: 3.7 MMOL/L (ref 3.5–5.1)
PROT SERPL-MCNC: 6.7 G/DL (ref 6.4–8.2)
Q-T INTERVAL, ECG07: 422 MS
QRS DURATION, ECG06: 84 MS
QTC CALCULATION (BEZET), ECG08: 388 MS
RBC # BLD AUTO: 5.13 M/UL (ref 4.1–5.7)
SODIUM SERPL-SCNC: 139 MMOL/L (ref 136–145)
VENTRICULAR RATE, ECG03: 51 BPM
WBC # BLD AUTO: 10.9 K/UL (ref 4.1–11.1)

## 2017-06-17 PROCEDURE — 36415 COLL VENOUS BLD VENIPUNCTURE: CPT | Performed by: SURGERY

## 2017-06-17 PROCEDURE — 85025 COMPLETE CBC W/AUTO DIFF WBC: CPT | Performed by: SURGERY

## 2017-06-17 PROCEDURE — 74011250637 HC RX REV CODE- 250/637: Performed by: SURGERY

## 2017-06-17 PROCEDURE — 74011000258 HC RX REV CODE- 258: Performed by: SURGERY

## 2017-06-17 PROCEDURE — 83690 ASSAY OF LIPASE: CPT | Performed by: SURGERY

## 2017-06-17 PROCEDURE — 99218 HC RM OBSERVATION: CPT

## 2017-06-17 PROCEDURE — 83735 ASSAY OF MAGNESIUM: CPT | Performed by: SURGERY

## 2017-06-17 PROCEDURE — 80053 COMPREHEN METABOLIC PANEL: CPT | Performed by: SURGERY

## 2017-06-17 PROCEDURE — 74011250636 HC RX REV CODE- 250/636: Performed by: SURGERY

## 2017-06-17 RX ADMIN — PIPERACILLIN SODIUM,TAZOBACTAM SODIUM 4.5 G: 4; .5 INJECTION, POWDER, FOR SOLUTION INTRAVENOUS at 02:20

## 2017-06-17 RX ADMIN — HYDROCODONE BITARTRATE AND ACETAMINOPHEN 1 TABLET: 5; 325 TABLET ORAL at 05:56

## 2017-06-17 RX ADMIN — Medication 10 ML: at 23:24

## 2017-06-17 RX ADMIN — HYDROCODONE BITARTRATE AND ACETAMINOPHEN 1 TABLET: 5; 325 TABLET ORAL at 16:35

## 2017-06-17 RX ADMIN — PIPERACILLIN SODIUM,TAZOBACTAM SODIUM 4.5 G: 4; .5 INJECTION, POWDER, FOR SOLUTION INTRAVENOUS at 23:24

## 2017-06-17 RX ADMIN — Medication 10 ML: at 02:24

## 2017-06-17 RX ADMIN — PIPERACILLIN SODIUM,TAZOBACTAM SODIUM 4.5 G: 4; .5 INJECTION, POWDER, FOR SOLUTION INTRAVENOUS at 16:36

## 2017-06-17 RX ADMIN — SODIUM CHLORIDE 150 ML/HR: 900 INJECTION, SOLUTION INTRAVENOUS at 09:06

## 2017-06-17 RX ADMIN — PIPERACILLIN SODIUM,TAZOBACTAM SODIUM 4.5 G: 4; .5 INJECTION, POWDER, FOR SOLUTION INTRAVENOUS at 09:10

## 2017-06-17 RX ADMIN — SODIUM CHLORIDE 150 ML/HR: 900 INJECTION, SOLUTION INTRAVENOUS at 02:19

## 2017-06-17 NOTE — PERIOP NOTES
TRANSFER - OUT REPORT:    Verbal report given to [facility] RN(name) on Shawn Mail  being transferred to [facility](unit) for routine progression of care       Report consisted of patients Situation, Background, Assessment and   Recommendations(SBAR). Information from the following report(s) OR Summary, Procedure Summary, Intake/Output and MAR was reviewed with the receiving nurse. Opportunity for questions and clarification was provided.       Patient transported with:   O2 @ 2 liters  Registered Nurse

## 2017-06-17 NOTE — PROGRESS NOTES
GI PROGRESS NOTE    NAME:             Dayana Napier   :              1980   MRN:              [id number]   Admit Date:     2017  Todays Date:  2017      Subjective:           Sore after surgery but no bad pains. Taking clear liquids. Medications-reviewed     Current Facility-Administered Medications   Medication Dose Route Frequency    piperacillin-tazobactam (ZOSYN) 4.5 g in 0.9% sodium chloride (MBP/ADV) 100 mL ADV  4.5 g IntraVENous Q8H    sodium chloride (NS) flush 5-10 mL  5-10 mL IntraVENous Q8H    sodium chloride (NS) flush 5-10 mL  5-10 mL IntraVENous PRN    HYDROcodone-acetaminophen (NORCO) 5-325 mg per tablet 1-2 Tab  1-2 Tab Oral Q6H PRN    HYDROmorphone (PF) (DILAUDID) injection 0.5 mg  0.5 mg IntraVENous Q2H PRN    ondansetron (ZOFRAN) injection 4 mg  4 mg IntraVENous Q4H PRN    0.9% sodium chloride infusion  150 mL/hr IntraVENous CONTINUOUS        Objective:   Patient Vitals for the past 8 hrs:   BP Temp Pulse Resp SpO2   17 0626 120/65 98.9 °F (37.2 °C) 84 18 97 %        06/15 1901 -  0700  In: 4045 [I.V.:4045]  Out: 450 [Urine:400]    EXAM:     NEURO-a&o   HEENT-wnl   LUNGS-clear   COR-regular rate and rhythym   ABD-soft , no tenderness, no rebound, good bs        LABS:  Recent Labs      17   0558  17   0355   WBC  10.9  15.9*   HGB  14.5  15.9   HCT  42.6  46.5   PLT  187  215     Recent Labs      17   0558  17   0355   NA  139  139   K  3.7  3.3*   CL  108  104   CO2  25  27   BUN  7  14   CREA  1.00  1.20   GLU  107*  233*   CA  8.2*  9.3   MG  2.1   --      Recent Labs      17   0558  17   0355   SGOT  290*  102*   AP  107  79   TBILI  1.9*  1.0   TP  6.7  8.1   ALB  3.1*  4.1   GLOB  3.6  4.0   LPSE  96  125   ALT  491*  100*                          Assessment:   1. Stable s/p ERCP with biliary sphincterotomy and stone extraction. LFT's up today  2. S/p lap sean           Plan:   1.  Diet advancement per  [place]  2.  Serial LFT's

## 2017-06-17 NOTE — PROGRESS NOTES
Pharmacy Automatic Renal Dosing Protocol - Antimicrobials    Indication for Antimicrobials: intra-abdominal infection (suspect acute calculous cholecystitis with choledocholithiasis)    Current Regimen of Each Antimicrobial (Start Day & Day of Therapy):  Zosyn 4.5 gm every 8 hr ( - day # 2)    Significant Cultures:   Abd ultrasound  - Common bile duct dilatation is concerning for choledocholithiasis    CAPD, Hemodialysis or Renal Replacement Therapy: no   Paralysis, amputations, malnutrition: no  Recent Labs      17   0558  17   0355   CREA  1.00  1.20   BUN  7  14   WBC  10.9  15.9*     Temp (24hrs), Av.7 °F (37.1 °C), Min:98.2 °F (36.8 °C), Max:99.3 °F (37.4 °C)    Creatinine Clearance (Creatinine Clearance (ml/min)): 101 ml/min     Impression/Plan:   - s/p lap sean today. Please consider discontinuing zosyn if no residual infection remains. Pharmacy will follow daily and adjust medications as appropriate for renal function and/or serum levels.     Thank you,  Norma Love, PHARMD

## 2017-06-17 NOTE — PROGRESS NOTES
Admit Date: 2017    POD 1 Day Post-Op    Procedure:  Procedure(s):  LAPAROSCOPIC CHOLECYSTECTOMY   ENDOSCOPIC RETROGRADE CHOLANGIOPANCREATOGRAPHY    Subjective:     Patient has no complaints. Objective:     Blood pressure 132/80, pulse 88, temperature 98.7 °F (37.1 °C), resp. rate 14, height 6' 3\" (1.905 m), weight 291 lb 3.6 oz (132.1 kg), SpO2 100 %. Temp (24hrs), Av.6 °F (37 °C), Min:98.2 °F (36.8 °C), Max:99.3 °F (37.4 °C)      Physical Exam:  GENERAL: alert, cooperative, no distress, appears stated age, LUNG: clear to auscultation bilaterally, HEART: regular rate and rhythm, S1, S2 normal, no murmur, click, rub or gallop, ABDOMEN: soft, non-tender. Bowel sounds normal. No masses,  no organomegaly, wounds c/d/i, EXTREMITIES:  extremities normal, atraumatic, no cyanosis or edema    Labs:   Recent Results (from the past 24 hour(s))   METABOLIC PANEL, COMPREHENSIVE    Collection Time: 17  5:58 AM   Result Value Ref Range    Sodium 139 136 - 145 mmol/L    Potassium 3.7 3.5 - 5.1 mmol/L    Chloride 108 97 - 108 mmol/L    CO2 25 21 - 32 mmol/L    Anion gap 6 5 - 15 mmol/L    Glucose 107 (H) 65 - 100 mg/dL    BUN 7 6 - 20 MG/DL    Creatinine 1.00 0.70 - 1.30 MG/DL    BUN/Creatinine ratio 7 (L) 12 - 20      GFR est AA >60 >60 ml/min/1.73m2    GFR est non-AA >60 >60 ml/min/1.73m2    Calcium 8.2 (L) 8.5 - 10.1 MG/DL    Bilirubin, total 1.9 (H) 0.2 - 1.0 MG/DL    ALT (SGPT) 491 (H) 12 - 78 U/L    AST (SGOT) 290 (H) 15 - 37 U/L    Alk.  phosphatase 107 45 - 117 U/L    Protein, total 6.7 6.4 - 8.2 g/dL    Albumin 3.1 (L) 3.5 - 5.0 g/dL    Globulin 3.6 2.0 - 4.0 g/dL    A-G Ratio 0.9 (L) 1.1 - 2.2     LIPASE    Collection Time: 17  5:58 AM   Result Value Ref Range    Lipase 96 73 - 393 U/L   CBC WITH AUTOMATED DIFF    Collection Time: 17  5:58 AM   Result Value Ref Range    WBC 10.9 4.1 - 11.1 K/uL    RBC 5.13 4.10 - 5.70 M/uL    HGB 14.5 12.1 - 17.0 g/dL    HCT 42.6 36.6 - 50.3 %    MCV 83.0 80.0 - 99.0 FL    MCH 28.3 26.0 - 34.0 PG    MCHC 34.0 30.0 - 36.5 g/dL    RDW 13.7 11.5 - 14.5 %    PLATELET 389 596 - 815 K/uL    NEUTROPHILS 80 (H) 32 - 75 %    LYMPHOCYTES 12 12 - 49 %    MONOCYTES 8 5 - 13 %    EOSINOPHILS 0 0 - 7 %    BASOPHILS 0 0 - 1 %    ABS. NEUTROPHILS 8.8 (H) 1.8 - 8.0 K/UL    ABS. LYMPHOCYTES 1.3 0.8 - 3.5 K/UL    ABS. MONOCYTES 0.9 0.0 - 1.0 K/UL    ABS. EOSINOPHILS 0.0 0.0 - 0.4 K/UL    ABS. BASOPHILS 0.0 0.0 - 0.1 K/UL   MAGNESIUM    Collection Time: 06/17/17  5:58 AM   Result Value Ref Range    Magnesium 2.1 1.6 - 2.4 mg/dL       Data Review images and reports reviewed    Assessment:     Active Problems:    Choledocholithiasis with cholecystitis (6/16/2017)        Plan/Recommendations/Medical Decision Making:     Continue present treatment   LFT's up  Continue zosyn today  Repeat labs in am  GI lite diet    Zain Borrero MD, [facility]

## 2017-06-18 VITALS
TEMPERATURE: 98.2 F | SYSTOLIC BLOOD PRESSURE: 132 MMHG | HEART RATE: 73 BPM | RESPIRATION RATE: 14 BRPM | DIASTOLIC BLOOD PRESSURE: 78 MMHG | BODY MASS INDEX: 36.21 KG/M2 | HEIGHT: 75 IN | OXYGEN SATURATION: 97 % | WEIGHT: 291.23 LBS

## 2017-06-18 LAB
ALBUMIN SERPL BCP-MCNC: 3.2 G/DL (ref 3.5–5)
ALBUMIN/GLOB SERPL: 0.9 {RATIO} (ref 1.1–2.2)
ALP SERPL-CCNC: 100 U/L (ref 45–117)
ALT SERPL-CCNC: 336 U/L (ref 12–78)
ANION GAP BLD CALC-SCNC: 10 MMOL/L (ref 5–15)
AST SERPL W P-5'-P-CCNC: 97 U/L (ref 15–37)
BILIRUB SERPL-MCNC: 0.9 MG/DL (ref 0.2–1)
BUN SERPL-MCNC: 7 MG/DL (ref 6–20)
BUN/CREAT SERPL: 8 (ref 12–20)
CALCIUM SERPL-MCNC: 8.3 MG/DL (ref 8.5–10.1)
CHLORIDE SERPL-SCNC: 109 MMOL/L (ref 97–108)
CO2 SERPL-SCNC: 22 MMOL/L (ref 21–32)
CREAT SERPL-MCNC: 0.9 MG/DL (ref 0.7–1.3)
GLOBULIN SER CALC-MCNC: 3.5 G/DL (ref 2–4)
GLUCOSE BLD STRIP.AUTO-MCNC: 101 MG/DL (ref 65–100)
GLUCOSE SERPL-MCNC: 99 MG/DL (ref 65–100)
POTASSIUM SERPL-SCNC: 3.6 MMOL/L (ref 3.5–5.1)
PROT SERPL-MCNC: 6.7 G/DL (ref 6.4–8.2)
SERVICE CMNT-IMP: ABNORMAL
SODIUM SERPL-SCNC: 141 MMOL/L (ref 136–145)

## 2017-06-18 PROCEDURE — 99218 HC RM OBSERVATION: CPT

## 2017-06-18 PROCEDURE — 74011250636 HC RX REV CODE- 250/636: Performed by: SURGERY

## 2017-06-18 PROCEDURE — 82962 GLUCOSE BLOOD TEST: CPT

## 2017-06-18 PROCEDURE — 80053 COMPREHEN METABOLIC PANEL: CPT | Performed by: SURGERY

## 2017-06-18 PROCEDURE — 36415 COLL VENOUS BLD VENIPUNCTURE: CPT | Performed by: SURGERY

## 2017-06-18 PROCEDURE — 74011000258 HC RX REV CODE- 258: Performed by: SURGERY

## 2017-06-18 RX ORDER — HYDROCODONE BITARTRATE AND ACETAMINOPHEN 5; 325 MG/1; MG/1
1-2 TABLET ORAL
Qty: 30 TAB | Refills: 0 | Status: SHIPPED | OUTPATIENT
Start: 2017-06-18 | End: 2017-07-29

## 2017-06-18 RX ADMIN — PIPERACILLIN SODIUM,TAZOBACTAM SODIUM 4.5 G: 4; .5 INJECTION, POWDER, FOR SOLUTION INTRAVENOUS at 09:39

## 2017-06-18 RX ADMIN — Medication 10 ML: at 06:18

## 2017-06-18 NOTE — DISCHARGE SUMMARY
Physician Discharge Summary     Patient ID:  Osvaldo Darling  [id number]  11 y.o.  1980    Admit Date: 6/16/2017    Discharge Date: 6/18/2017    Admission Diagnoses: cholecystitis; Choledocholithiasis with cholecystitis    Discharge Diagnoses: Active Problems:    Choledocholithiasis with cholecystitis (6/16/2017)         Admission Condition: Fair    Discharge Condition: Good    Last Procedure: Procedure(s):  LAPAROSCOPIC CHOLECYSTECTOMY   ENDOSCOPIC [address] Course:   Normal hospital course for this procedure. Consults: Gastroenterology    Disposition: home    Patient Instructions:   Current Discharge Medication List      START taking these medications    Details   HYDROcodone-acetaminophen (NORCO) 5-325 mg per tablet Take 1-2 Tabs by mouth every four (4) hours as needed for Pain. Max Daily Amount: 12 Tabs. Qty: 30 Tab, Refills: 0           Activity: No driving while on analgesics and No heavy lifting for 4 weeks  Diet: Low fat, Low cholesterol  Wound Care: Keep wound clean and dry    Follow-up with Dr. Hood Morris in 2 weeks.   Follow-up tests/labs none    Signed:  Massiel Craft MD  [facility]  6/18/2017  2:07 PM

## 2017-06-18 NOTE — PROGRESS NOTES
End of Shift Nursing Note    Bedside shift change report given to Mata Germain RN (oncoming nurse) by Francisca Catalan (offgoing nurse). Report included the following information SBAR, Kardex, Procedure Summary, Intake/Output, MAR and Recent Results. Zone Phone:   [phone]    Significant changes during shift:    No c/o of pain, Kidney function within normal range, liver function improving   Non-emergent issues for physician to address:   none     Number times ambulated in hallway past shift: 0      Number of times OOB to chair past shift: 3    POD #: 2     Vital Signs:    Temp: 98 °F (36.7 °C)     Pulse (Heart Rate): 63     BP: 134/86     Resp Rate: 18     O2 Sat (%): 97 %    Lines & Drains:     Urinary Catheter? No   Central Line? No   PICC Line? No       NG tube [] in [] removed [x] not applicable   Drains [] in [] removed [x] not applicable     Skin Integrity:      Wounds: yes   Dressings Present: yes    Wound Concerns: no      GI:    Current diet:  DIET GI LITE (POST SURGICAL)    Nausea: NO  Vomiting: NO  Bowel Sounds: YES  Flatus: YES  Last Bowel Movement:       Respiratory:  Supplemental O2: No      Incentive Spirometer: YES    Coughing and Deep Breathing: YES  Oral Care: YES  Understanding (patient/family education): YES   Getting out of bed: YES  Head of bed elevation: YES    Patient Safety:    Falls Score: 1  Mobility Score: 1  Bed Alarm On? No  Sitter? No      Opportunity for questions and clarification was given to oncoming nurse. Patient bed is in low position, side rails are up x 2, door & observation blinds open as needed, call bell within reach and patient not in distress. Sandie Camargo RN

## 2017-06-18 NOTE — DISCHARGE INSTRUCTIONS
Cholecystectomy: What to Expect at [address] [place] [address]  After your surgery, it is normal to feel weak and tired for several days after you return home. Your belly may be swollen. If you had laparoscopic surgery, you may also have pain in your shoulder for about 24 hours. You may have gas or need to burp a lot at first, and a few people get diarrhea. The diarrhea usually goes away in 2 to 4 weeks, but it may last longer. How quickly you recover depends on whether you had a laparoscopic or open surgery. · For a laparoscopic surgery, most people can go back to work or their normal routine in 1 to 2 weeks, but it may take longer, depending on the type of work you do. · For an open surgery, it will probably take 4 to 6 weeks before you get back to your normal routine. This care sheet gives you a general idea about how long it will take for you to recover. However, each person recovers at a different pace. Follow the steps below to get better as quickly as possible. How can you care for yourself at home? Activity  · Rest when you feel tired. Getting enough sleep will help you recover. · Try to walk each day. Start out by walking a little more than you did the day before. Gradually increase the amount you walk. Walking boosts blood flow and helps prevent pneumonia and constipation. · For about 2 to 4 weeks, avoid lifting anything that would make you strain. This may include a child, heavy grocery bags and milk containers, a heavy briefcase or backpack, cat litter or dog food bags, or a vacuum . · Avoid strenuous activities, such as biking, jogging, weightlifting, and aerobic exercise, until your doctor says it is okay. · You may shower 24 to 48 hours after surgery, if your doctor okays it. Pat the cut (incision) dry. Do not take a bath for the first 2 weeks, or until your doctor tells you it is okay.   · You may drive when you are no longer taking pain medicine and can quickly move your foot from the gas pedal to the brake. You must also be able to sit comfortably for a long period of time, even if you do not plan to go far. You might get caught in traffic. · For a laparoscopic surgery, most people can go back to work or their normal routine in 1 to 2 weeks, but it may take longer. For an open surgery, it will probably take 4 to 6 weeks before you get back to your normal routine. · Your doctor will tell you when you can have sex again. Diet  · Eat smaller meals more often instead of fewer larger meals. You can eat a normal diet, but avoid eating fatty foods for about 1 month. Fatty foods include hamburger, whole milk, cheese, and many snack foods. If your stomach is upset, try bland, low-fat foods like plain rice, broiled chicken, toast, and yogurt. · Drink plenty of fluids (unless your doctor tells you not to). · If you have diarrhea, try avoiding spicy foods, dairy products, fatty foods, and alcohol. You can also watch to see if specific foods cause it, and stop eating them. If the diarrhea continues for more than 2 weeks, talk to your doctor. · You may notice that your bowel movements are not regular right after your surgery. This is common. Try to avoid constipation and straining with bowel movements. You may want to take a fiber supplement every day. If you have not had a bowel movement after a couple of days, ask your doctor about taking a mild laxative. Medicines  · Take pain medicines exactly as directed. ¨ If the doctor gave you a prescription medicine for pain, take it as prescribed. ¨ If you are not taking a prescription pain medicine, take an over-the-counter medicine such as acetaminophen (Tylenol), ibuprofen (Advil, Motrin), or naproxen (Aleve). Read and follow all instructions on the label. ¨ Do not take two or more pain medicines at the same time unless the doctor told you to. Many pain medicines contain acetaminophen, which is Tylenol. Too much Tylenol can be harmful.   · If you think your pain medicine is making you sick to your stomach:  ¨ Take your medicine after meals (unless your doctor tells you not to). ¨ Ask your doctor for a different pain medicine. · If your doctor prescribed antibiotics, take them as directed. Do not stop taking them just because you feel better. You need to take the full course of antibiotics. Incision care  · If you have strips of tape on the incision, or cut, leave the tape on for a week or until it falls off. · After 24 to 48 hours, wash the area daily with warm, soapy water, and pat it dry. · You may have staples to hold the cut together. Keep them dry until your doctor takes them out. This is usually in 7 to 10 days. · Keep the area clean and dry. You may cover it with a gauze bandage if it weeps or rubs against clothing. Change the bandage every day. Ice   · To reduce swelling and pain, put ice or a cold pack on your belly for 10 to 20 minutes at a time. Do this every 1 to 2 hours. Put a thin cloth between the ice and your skin. Follow-up care is a key part of your treatment and safety. Be sure to make and go to all appointments, and call your doctor if you are having problems. Its also a good idea to know your test results and keep a list of the medicines you take. When should you call for help? Call 911 anytime you think you may need emergency care. For example, call if:  · You passed out (lost consciousness). · You have severe trouble breathing. · You have sudden chest pain and shortness of breath, or you cough up blood. Call your doctor now or seek immediate medical care if:  · You are sick to your stomach and cannot drink fluids. · You have pain that does not get better when you take your pain medicine. · You have signs of infection, such as:  ¨ Increased pain, swelling, warmth, or redness. ¨ Red streaks leading from the incision. ¨ Pus draining from the incision. ¨ Swollen lymph nodes in your neck, armpits, or groin. ¨ A fever.   · Your urine turns dark brown or your stool is light-colored or kassandra-colored. · Your skin or the whites of your eyes turn yellow. · Bright red blood has soaked through a large bandage over your incision. · You have signs of a blood clot, such as:  ¨ Pain in your calf, back of knee, thigh, or groin. ¨ Redness and swelling in your leg or groin. · You have trouble passing urine or stool, especially if you have mild pain or swelling in your lower belly. Watch closely for any changes in your health, and be sure to contact your doctor if:  · You had a laparoscopic surgery and your shoulder pain lasts more than 24 hours. · You do not have a bowel movement after taking a laxative. Where can you learn more? Go to [employer]  Enter F357 in the search box to learn more about \"Cholecystectomy: What to Expect at Home. \"   © 5039-5085 Healthwise, Incorporated. Care instructions adapted under license by [employer] (which disclaims liability or warranty for this information). This care instruction is for use with your licensed healthcare professional. If you have questions about a medical condition or this instruction, always ask your healthcare professional. [place] [address] any warranty or liability for your use of this information.   Content Version: 03.1.458184; Current as of: November 14, 2014

## 2017-06-18 NOTE — PROGRESS NOTES
End of Shift Nursing Note    Bedside shift change report given to RN (oncoming nurse) by Otilia Nickerson (offgoing nurse). Report included the following information SBAR, Kardex, Procedure Summary, Intake/Output, MAR and Recent Results. Zone Phone:   [phone]    Significant changes during shift:    none   Non-emergent issues for physician to address:   none     Number times ambulated in hallway past shift: 0      Number of times OOB to chair past shift: 2    POD #: 1     Vital Signs:    Temp: 98.2 °F (36.8 °C)     Pulse (Heart Rate): 68     BP: 132/76     Resp Rate: 18     O2 Sat (%): 98 %    Lines & Drains:     Urinary Catheter? No   Central Line? No   PICC Line? No       NG tube [] in [] removed [x] not applicable   Drains [] in [] removed [x] not applicable     Skin Integrity:      Wounds: yes   Dressings Present: yes    Wound Concerns: no      GI:    Current diet:  DIET GI LITE (POST SURGICAL)    Nausea: NO  Vomiting: NO  Bowel Sounds: YES  Flatus: YES  Last Bowel Movement: several days ago     Respiratory:  Supplemental O2: No          Incentive Spirometer: YES  Coughing and Deep Breathing: YES  Oral Care: YES  Understanding (patient/family education): YES   Getting out of bed: YES  Head of bed elevation: YES    Patient Safety:    Falls Score: 1  Mobility Score: 1  Bed Alarm On? No  Sitter? No      Opportunity for questions and clarification was given to oncoming nurse. Patient bed is in low position, side rails are up x 2, door & observation blinds open as needed, call bell within reach and patient not in distress. Sandie Camargo RN

## 2017-06-18 NOTE — PROGRESS NOTES
GI PROGRESS NOTE    NAME:             Sonia Jordan   :              1980   MRN:              [id number]   Admit Date:     2017  Todays Date:  2017      Subjective:         No significant abdominal pains. Tolerating diet.     Medications-reviewed     Current Facility-Administered Medications   Medication Dose Route Frequency    piperacillin-tazobactam (ZOSYN) 4.5 g in 0.9% sodium chloride (MBP/ADV) 100 mL ADV  4.5 g IntraVENous Q8H    sodium chloride (NS) flush 5-10 mL  5-10 mL IntraVENous Q8H    sodium chloride (NS) flush 5-10 mL  5-10 mL IntraVENous PRN    HYDROcodone-acetaminophen (NORCO) 5-325 mg per tablet 1-2 Tab  1-2 Tab Oral Q6H PRN    HYDROmorphone (PF) (DILAUDID) injection 0.5 mg  0.5 mg IntraVENous Q2H PRN    ondansetron (ZOFRAN) injection 4 mg  4 mg IntraVENous Q4H PRN        Objective:     Patient Vitals for the past 8 hrs:   BP Temp Pulse Resp SpO2   17 0715 132/78 98.2 °F (36.8 °C) 73 14 97 %   17 0403 134/86 98 °F (36.7 °C) 63 18 97 %      0701 -  1900  In: 0   Out: 900 [Urine:900]   1901 -  0700  In: 3562.5 [I.V.:3562.5]  Out: 3998 [Urine:2650]    EXAM:     NEURO-a&o   HEENT-wnl   LUNGS-clear   COR-regular rate and rhythym   ABD-soft , no tenderness, no rebound, good bs        LABS:  Recent Labs      17   0558  17   0355   WBC  10.9  15.9*   HGB  14.5  15.9   HCT  42.6  46.5   PLT  187  215     Recent Labs      17   0413  17   0558  17   0355   NA  141  139  139   K  3.6  3.7  3.3*   CL  109*  108  104   CO2  22  25  27   BUN  7  7  14   CREA  0.90  1.00  1.20   GLU  99  107*  233*   CA  8.3*  8.2*  9.3   MG   --   2.1   --      Recent Labs      17   0413  17   0558  17   0355   SGOT  97*  290*  102*   AP  100  107  79   TBILI  0.9  1.9*  1.0   TP  6.7  6.7  8.1   ALB  3.2*  3.1*  4.1   GLOB  3.5  3.6  4.0   LPSE   --   96  125   ALT  336*  491*  100*                          Assessment: 1. Stable s/p ERCP with biliary sphincterotomy and stone extraction. LFT's better today  2. S/p lap sean           Plan:   1.  LFT's can be repeated as outpatient (during post-op visit)

## 2017-06-30 ENCOUNTER — OFFICE VISIT (OUTPATIENT)
Dept: SURGERY | Age: 37
End: 2017-06-30

## 2017-06-30 VITALS
HEART RATE: 68 BPM | BODY MASS INDEX: 34.07 KG/M2 | DIASTOLIC BLOOD PRESSURE: 87 MMHG | HEIGHT: 75 IN | WEIGHT: 274 LBS | RESPIRATION RATE: 20 BRPM | OXYGEN SATURATION: 99 % | SYSTOLIC BLOOD PRESSURE: 136 MMHG

## 2017-06-30 DIAGNOSIS — K80.40 CHOLEDOCHOLITHIASIS WITH CHOLECYSTITIS: Primary | ICD-10-CM

## 2017-06-30 NOTE — MR AVS SNAPSHOT
Visit Information Date & Time Provider Department Dept. Phone Encounter #  
 6/30/2017  9:40 AM Sonia Mitchell MD [facility] [phone] [id number] Upcoming Health Maintenance Date Due DTaP/Tdap/Td series (1 - Tdap) 1/7/2002 INFLUENZA AGE 9 TO ADULT 8/1/2017 Allergies as of 6/30/2017  Review Complete On: 6/30/2017 By: Sonia Mitchell MD  
 No Known Allergies Current Immunizations  Never Reviewed No immunizations on file. Not reviewed this visit You Were Diagnosed With   
  
 Codes Comments Choledocholithiasis with cholecystitis    -  Primary ICD-10-CM: K80.40 ICD-9-CM: 574.40 Vitals BP Pulse Resp Height(growth percentile) Weight(growth percentile) SpO2  
 136/87 68 20 6' 3\" (1.905 m) 274 lb (124.3 kg) 99% BMI Smoking Status 34.25 kg/m2 Never Smoker BMI and BSA Data Body Mass Index Body Surface Area  
 34.25 kg/m 2 2.56 m 2 [facility] [address], [address] [place] [address]. [phone] Your Updated Medication List  
  
   
This list is accurate as of: 6/30/17 10:35 AM.  Always use your most recent med list.  
  
  
  
  
 HYDROcodone-acetaminophen 5-325 mg per tablet Commonly known as:  Asya Schlichter Take 1-2 Tabs by mouth every four (4) hours as needed for Pain. Max Daily Amount: 12 Tabs. Introducing [facility]! Tahira Jeffers introduces [employer] patient portal. Now you can access parts of your medical record, email your doctor's office, and request medication refills online. 1. In your internet browser, go to [email or web address] 2. Click on the First Time User? Click Here link in the Sign In box. You will see the New Member Sign Up page. 3. Enter your [employer] Access Code exactly as it appears below. You will not need to use this code after youve completed the sign-up process.  If you do not sign up before the expiration date, you must request a new code. · [employer] Access Code: [email or web address] Expires: 9/14/2017  2:26 AM 
 
4. Enter the last four digits of your Social Security Number (xxxx) and Date of Birth (mm/dd/yyyy) as indicated and click Submit. You will be taken to the next sign-up page. 5. Create a [employer] ID. This will be your [employer] login ID and cannot be changed, so think of one that is secure and easy to remember. 6. Create a [employer] password. You can change your password at any time. 7. Enter your Password Reset Question and Answer. This can be used at a later time if you forget your password. 8. Enter your e-mail address. You will receive e-mail notification when new information is available in [address] 9. Click Sign Up. You can now view and download portions of your medical record. 10. Click the Download Summary menu link to download a portable copy of your medical information. If you have questions, please visit the Frequently Asked Questions section of the [employer] website. Remember, [employer] is NOT to be used for urgent needs. For medical emergencies, dial 911. Now available from your iPhone and Android! Please provide this summary of care documentation to your next provider. Your primary care clinician is listed as [facility]. If you have any questions after today's visit, please call [phone].

## 2017-06-30 NOTE — PROGRESS NOTES
SUBJECTIVE: Dania Du is a 40 y.o. male is seen for a routine postop check s/p lap cholecystectomy. Reports no problems with the wound or other issues. Activity, diet and bowels are normal. No pain. OBJECTIVE: Appears well. Wounds are well healed without complications or infection. ASSESSMENT: normal postoperative course, doing well. PLAN: Patient is instructed to avoid heavy lifting for 2 more weeks. Return PRN for any problems or concerns.

## 2017-07-29 ENCOUNTER — HOSPITAL ENCOUNTER (EMERGENCY)
Age: 37
Discharge: HOME | End: 2017-07-29
Attending: EMERGENCY MEDICINE
Payer: OTHER MISCELLANEOUS

## 2017-07-29 VITALS
HEART RATE: 58 BPM | DIASTOLIC BLOOD PRESSURE: 73 MMHG | TEMPERATURE: 98.1 F | OXYGEN SATURATION: 100 % | BODY MASS INDEX: 34.72 KG/M2 | WEIGHT: 277.78 LBS | RESPIRATION RATE: 18 BRPM | SYSTOLIC BLOOD PRESSURE: 130 MMHG

## 2017-07-29 DIAGNOSIS — S86.811A STRAIN OF CALF MUSCLE, RIGHT, INITIAL ENCOUNTER: Primary | ICD-10-CM

## 2017-07-29 DIAGNOSIS — S80.11XA CONTUSION OF RIGHT LOWER LEG, INITIAL ENCOUNTER: ICD-10-CM

## 2017-07-29 PROCEDURE — 75810000053 HC SPLINT APPLICATION

## 2017-07-29 PROCEDURE — 99283 EMERGENCY DEPT VISIT LOW MDM: CPT

## 2017-07-29 RX ORDER — HYDROCODONE BITARTRATE AND ACETAMINOPHEN 5; 325 MG/1; MG/1
1 TABLET ORAL
Qty: 20 TAB | Refills: 0 | Status: SHIPPED | OUTPATIENT
Start: 2017-07-29

## 2017-07-29 NOTE — ED PROVIDER NOTES
HPI Comments: Analy Loja, 40 y.o. male, with PMHx of gall stones and cholecystectomy presents ambulatory with limp to ED [facility] ED with cc of right-sided calf pain with associated redness and swelling present since 2 days ago. Pt states that he was descending from a ladder 2 days ago when he stepped onto the ground and felt something \"pop\" in his right calf. He states that the area was immediately painful and that he feels no relief upon icing the area. He notes that he can ambulate with pain. He notes increased swelling and redness today. He denies any hx of similar injuries. He denies recent Abx use. He denies recent steroid usage. He denies recent long travelling. He reports going to [facility] initially today and was told to come to the ED to rule out blood clot or compartment syndrome. He denies any other pain. PCP: Edward Diop MD    Social history significant for: - Tobacco, - EtOH, - Illicit Drug Use    There are no other complaints, changes, or physical findings at this time. The history is provided by the patient. No  was used. Past Medical History:   Diagnosis Date    Gall stones        Past Surgical History:   Procedure Laterality Date    HX CHOLECYSTECTOMY  06/16/2017    Dr. Manan Chaudhary    HX HEENT      wisdom teeth    RI ERCP REMOVE CALCULI/DEBRIS BILIARY/PANCREAS DUCT  6/16/2017         RI ERCP W/SPHINCTEROTOMY/PAPILLOTOMY  6/16/2017              History reviewed. No pertinent family history. Social History     Social History    Marital status: SINGLE     Spouse name: N/A    Number of children: N/A    Years of education: N/A     Occupational History    Not on file.      Social History Main Topics    Smoking status: Never Smoker    Smokeless tobacco: Never Used    Alcohol use No    Drug use: No    Sexual activity: Not on file     Other Topics Concern    Not on file     Social History Narrative         ALLERGIES: Review of patient's allergies indicates no known allergies. Review of Systems   Constitutional: Negative. Negative for fever. HENT: Negative. Eyes: Negative. Respiratory: Negative. Cardiovascular: Negative. Gastrointestinal: Negative. Negative for constipation, diarrhea, nausea and vomiting. Denies liver disease   Endocrine:        Denies thyroid disease   Genitourinary: Negative. Negative for dysuria. Denies kidney disease   Musculoskeletal: Positive for arthralgias (right calf pain) and joint swelling (right calf). Skin: Positive for color change (redness to right calf). Neurological: Negative. All other systems reviewed and are negative. Patient Vitals for the past 12 hrs:   Temp Pulse Resp BP SpO2   07/29/17 1846 98.1 °F (36.7 °C) (!) 58 18 130/73 100 %        Physical Exam   Constitutional: He is oriented to person, place, and time. He appears well-developed and well-nourished. No distress. HENT:   Head: Normocephalic and atraumatic. Right Ear: External ear normal.   Left Ear: External ear normal.   Nose: Nose normal.   Mouth/Throat: Oropharynx is clear and moist. No oropharyngeal exudate. Eyes: Conjunctivae and EOM are normal. Pupils are equal, round, and reactive to light. Right eye exhibits no discharge. Left eye exhibits no discharge. No scleral icterus. Neck: Normal range of motion. Neck supple. No tracheal deviation present. Cardiovascular: Normal rate, regular rhythm, normal heart sounds and intact distal pulses. Exam reveals no gallop and no friction rub. No murmur heard. Intact pedal pulse bilaterally   Pulmonary/Chest: Effort normal and breath sounds normal. No respiratory distress. He has no wheezes. He has no rales. He exhibits no tenderness. Musculoskeletal: He exhibits no edema or tenderness. + Swelling to right lower leg  + Tenderness to posterior right calf  + Pain with dorsiflexion of right foot  Right calf is supple.    Intact plantar dorsiflexion  Intact achilles tendon  Capillary refill is less than 3 seconds  Left calf measures 41.3 cm in circumference  Right calf measures 45 cm in circumference   Lymphadenopathy:     He has no cervical adenopathy. Neurological: He is alert and oriented to person, place, and time. No cranial nerve deficit. Coordination normal.   Sensation intact distally   Skin: Skin is warm and dry. No rash noted. No erythema.   + Ecchymosis to medical aspect of right hind foot  + Right leg is pink and warm    Psychiatric: He has a normal mood and affect. His behavior is normal. Judgment and thought content normal.   Nursing note and vitals reviewed. MDM  Number of Diagnoses or Management Options  Strain of calf muscle, right, initial encounter:   Diagnosis management comments: DDx: achilles tendon rupture, sprain, strain, contusion. Low suspicion for DVT, low suspicion for compartment syndrome. Amount and/or Complexity of Data Reviewed  Review and summarize past medical records: yes  Discuss the patient with other providers: yes (Orthopedics)    Patient Progress  Patient progress: stable    ED Course       Procedures    Consult Note:  7:30 PM  Julio Currie PA-C spoke with Luis Smith NP  Specialty: Orthopedics  Discussed pt's hx, disposition, and available diagnostic and imaging results. Reviewed care plans. Consultant agrees with plans as outlined. Spoke to HENRY Patel. She is going to consult with Dr Junior Andersen (attending physician) and confirm the treatment plan of posterior splint with crutches, non weight-bearing. Follow up as outpatient. She will confirm these plans with us. Progress Note:  7:38 PM  HENRY Patel confirmed treatment plan with Dr. Yoli Zavala approval via [address]. Progress Note:  7:42 PM  Patient defers pain medication at this time. Procedure Note - Splint Placement:  7:48 PM  Performed by: Julio Currie PA-C  Neurovascularly intact prior to tx. An Orthoglass posterior short leg splint was placed on pt's right calf. Joint was placed in neutral position. Neurovascularly intact after tx. The procedure took 1-15 minutes, and pt tolerated well. Written by Nayan Grewal, ED Scribe, as dictated by Olga Lincoln PA-C. IMPRESSION:  1. Strain of calf muscle, right, initial encounter    2. Contusion of right lower leg, initial encounter        PLAN:  1. Current Discharge Medication List      START taking these medications    Details   HYDROcodone-acetaminophen (NORCO) 5-325 mg per tablet Take 1 Tab by mouth every six (6) hours as needed for Pain. Max Daily Amount: 4 Tabs. Qty: 20 Tab, Refills: 0           2. Follow-up Information     Follow up With Details [address] Griselda Hernandez MD   [address]  [address] [place] [address] Drive      Sergei Chin MD Schedule an appointment as soon as possible for a visit bone and muscle specialist [address]  [address] [place] [address]  [facility]  [phone]      [facility] EMERGENCY DEPT  If symptoms worsen [facility]  [address] [facility]  [phone]        Return to ED if worse     Discharge Note:  7:50 PM  The pt is ready for discharge. The pt's signs, symptoms, diagnosis, and discharge instructions have been discussed and pt has conveyed their understanding. The pt is to follow up as recommended or return to ER should their symptoms worsen. Plan has been discussed and pt is in agreement. This note is prepared by Lynnette White, acting as a Scribe for Karl. Olga Lincoln PA-C: The scribe's documentation has been prepared under my direction and personally reviewed by me in its entirety. I confirm that the notes above accurately reflects all work, treatment, procedures, and medical decision making performed by me.

## 2017-07-29 NOTE — DISCHARGE INSTRUCTIONS
Contusion: Care Instructions  Your Care Instructions  Contusion is the medical term for a bruise. It is the result of a direct blow or an impact, such as a fall. Contusions are common sports injuries. Most people think of a bruise as a black-and-blue spot. This happens when small blood vessels get torn and leak blood under the skin. But bones, muscles, and organs can also get bruised. This may damage deep tissues but not cause a bruise you can see. The doctor will do a physical exam to find the location of your contusion. You may also have tests to make sure you do not have a more serious injury, such as a broken bone or nerve damage. These may include X-rays or other imaging tests like a CT scan or MRI. Deep-tissue contusions may cause pain and swelling. But if there is no serious damage, they will often get better in a few weeks with home treatment. The doctor has checked you carefully, but problems can develop later. If you notice any problems or new symptoms, get medical treatment right away. Follow-up care is a key part of your treatment and safety. Be sure to make and go to all appointments, and call your doctor if you are having problems. It's also a good idea to know your test results and keep a list of the medicines you take. How can you care for yourself at home? · Put ice or a cold pack on the sore area for 10 to 20 minutes at a time to stop swelling. Put a thin cloth between the ice pack and your skin. · Be safe with medicines. Read and follow all instructions on the label. ¨ If the doctor gave you a prescription medicine for pain, take it as prescribed. ¨ If you are not taking a prescription pain medicine, ask your doctor if you can take an over-the-counter medicine. · If you can, prop up the sore area on pillows as much as possible for the next few days. Try to keep the sore area above the level of your heart. When should you call for help?   Call your doctor now or seek immediate medical care if:  · Your pain gets worse. · You have new or worse swelling. · You have tingling, weakness, or numbness in the area near the contusion. · The area near the contusion is cold or pale. Watch closely for changes in your health, and be sure to contact your doctor if:  · You do not get better as expected. Where can you learn more? Go to [employer]  Enter [id number] in the search box to learn more about \"Contusion: Care Instructions. \"   © 9421-4639 [employer]. Care instructions adapted under license by [facility] [employer] (which disclaims liability or warranty for this information). This care instruction is for use with your licensed healthcare professional. If you have questions about a medical condition or this instruction, always ask your healthcare professional. [address] any warranty or liability for your use of this information. Content Version: 14.0.092423; Current as of: May 22, 2015           Muscle Strain: Care Instructions  Your Care Instructions  A muscle strain happens when you overstretch, or pull, a muscle. It can happen when you exercise or lift something or when you have an accident. Rest and other home care can help the muscle heal.  Follow-up care is a key part of your treatment and safety. Be sure to make and go to all appointments, and call your doctor if you are having problems. Its also a good idea to know your test results and keep a list of the medicines you take. How can you care for yourself at home? · Rest the strained muscle. Do not put weight on it for a day or two. If your doctor advises you to, use crutches or a sling to rest a sore limb. · Put ice or a cold pack on the sore muscle for 10 to 20 minutes at a time to stop swelling. Put a thin cloth between the ice pack and your skin. · Prop up the sore arm or leg on a pillow when you ice it or anytime you sit or lie down during the next 3 days.  Try to keep it above the level of your heart. This will help reduce swelling. · Take pain medicines exactly as directed. ¨ If the doctor gave you a prescription medicine for pain, take it as prescribed. ¨ If you are not taking a prescription pain medicine, ask your doctor if you can take an over-the-counter medicine. · Do not do anything that makes the pain worse. Return to exercise gradually as you feel better. When should you call for help? Call your doctor now or seek immediate medical care if:  · You have new severe pain. · Your injured limb is cool or pale or changes color. · You have tingling, weakness, or numbness in your injured limb. · You cannot move the injured area. Watch closely for changes in your health, and be sure to contact your doctor if:  · You cannot put weight on a joint, or it feels unsteady when you walk. · Pain and swelling get worse or do not start to get better after 2 days of home treatment. Where can you learn more? Go to http://[employer].info/. Enter F096 in the search box to learn more about \"Muscle Strain: Care Instructions. \"  Current as of: March 21, 2017  Content Version: 11.3  © 9313-0344 [employer]. Care instructions adapted under license by [employer] (which disclaims liability or warranty for this information). If you have questions about a medical condition or this instruction, always ask your healthcare professional. [address] any warranty or liability for your use of this information.

## 2017-07-29 NOTE — ED TRIAGE NOTES
Patient presents to ED with c/o right calf pain after stepping down off a ladder he felt a \"popping\" sensation on Thursday. He was able to ambulate on his tip-toe to prevent full extension. Noted swelling to calf and ankle.

## 2017-07-29 NOTE — LETTER
[address] 
[facility] EMERGENCY DEPT 
[address] [place] [address] [id number] 
[phone] Work/School Note Date: 7/29/2017 To Whom It May concern: 
 
Analy Loja was seen and treated today in the emergency room by the following provider(s): 
Attending Provider: Jesus Justin MD 
Physician Assistant: JALEN Holley. Analy Loja may return to work on 8/1/17 or sooner, if feeling better. Sincerely, Connie Nick PA

## 2017-07-30 NOTE — ED NOTES
PA provided pt with crutches and instructions. Provider at bedside for dispo and follow up. Discharge plan reviewed and paperwork signed, pain level within manageable comfortable limits, wheeled to exit, gait steady, safety maintained.
